# Patient Record
Sex: FEMALE | Race: WHITE | Employment: OTHER | URBAN - METROPOLITAN AREA
[De-identification: names, ages, dates, MRNs, and addresses within clinical notes are randomized per-mention and may not be internally consistent; named-entity substitution may affect disease eponyms.]

---

## 2017-09-18 ENCOUNTER — OFFICE VISIT (OUTPATIENT)
Dept: INTERNAL MEDICINE CLINIC | Age: 57
End: 2017-09-18

## 2017-09-18 VITALS
HEART RATE: 86 BPM | WEIGHT: 141 LBS | SYSTOLIC BLOOD PRESSURE: 107 MMHG | DIASTOLIC BLOOD PRESSURE: 66 MMHG | TEMPERATURE: 97.8 F | RESPIRATION RATE: 16 BRPM | HEIGHT: 66 IN | BODY MASS INDEX: 22.66 KG/M2 | OXYGEN SATURATION: 99 %

## 2017-09-18 DIAGNOSIS — Z00.00 PHYSICAL EXAM, ANNUAL: Primary | ICD-10-CM

## 2017-09-18 RX ORDER — GLUCOSAMINE SULFATE 1500 MG
POWDER IN PACKET (EA) ORAL DAILY
COMMUNITY

## 2017-09-18 NOTE — MR AVS SNAPSHOT
Visit Information Date & Time Provider Department Dept. Phone Encounter #  
 9/18/2017 10:30 AM Yasmin Stanford, [address] [phone] [id number] Follow-up Instructions Return in about 1 year (around 9/18/2018). Upcoming Health Maintenance Date Due  
 BREAST CANCER SCRN MAMMOGRAM 11/8/2018 PAP AKA CERVICAL CYTOLOGY 11/4/2019 COLONOSCOPY 8/10/2022 DTaP/Tdap/Td series (2 - Td) 8/10/2025 Allergies as of 9/18/2017  Review Complete On: 9/18/2017 By: Yasmin Stanford MD  
  
 Severity Noted Reaction Type Reactions Venom-honey Bee  07/16/2015    Unknown (comments) Current Immunizations  Never Reviewed Name Date Pneumococcal Polysaccharide (PPSV-23) 8/10/2015 Tdap 8/10/2015 Not reviewed this visit You Were Diagnosed With   
  
 Codes Comments Physical exam, annual    -  Primary ICD-10-CM: Z00.00 ICD-9-CM: V70.0 Vitals BP Pulse Temp Resp Height(growth percentile) Weight(growth percentile) 107/66 (BP 1 Location: Left arm, BP Patient Position: Sitting) 86 97.8 °F (36.6 °C) (Oral) 16 5' 6\" (1.676 m) 141 lb (64 kg) SpO2 BMI OB Status Smoking Status 99% 22.76 kg/m2 Postmenopausal Former Smoker Vitals History BMI and BSA Data Body Mass Index Body Surface Area  
 22.76 kg/m 2 1.73 m 2 Preferred Pharmacy Pharmacy Name Phone [phone] [facility], [address] [place] [phone] Your Updated Medication List  
  
   
This list is accurate as of: 9/18/17 10:35 AM.  Always use your most recent med list.  
  
  
  
  
 MULTIVITAMIN PO Take  by mouth. nystatin-triamcinolone topical cream  
Commonly known as:  MYCOLOG II Apply  to affected area two (2) times a day. Monday to Friday only VITAMIN D3 1,000 unit Cap Generic drug:  cholecalciferol Take  by mouth daily. We Performed the Following AMB POC EKG ROUTINE W/ 12 LEADS, INTER & REP [49387 CPT(R)] CBC W/O DIFF [65424 CPT(R)] HEMOGLOBIN A1C WITH EAG [47684 CPT(R)] LIPID PANEL [72641 CPT(R)] METABOLIC PANEL, COMPREHENSIVE [55586 CPT(R)] TSH 3RD GENERATION [20372 CPT(R)] VITAMIN D, 25 HYDROXY [id number] CPT(R)] Follow-up Instructions Return in about 1 year (around 9/18/2018). To-Do List   
 11/06/2017 Imaging:  JOE MAMMO BI SCREENING INCL CAD Introducing [facility]! Nora Bronson introduces Clarient patient portal. Now you can access parts of your medical record, email your doctor's office, and request medication refills online. 1. In your internet browser, go to [email or web address] 2. Click on the First Time User? Click Here link in the Sign In box. You will see the New Member Sign Up page. 3. Enter your Clarient Access Code exactly as it appears below. You will not need to use this code after youve completed the sign-up process. If you do not sign up before the expiration date, you must request a new code. · Clarient Access Code: [email or web address] Expires: 12/17/2017 10:35 AM 
 
4. Enter the last four digits of your Social Security Number (xxxx) and Date of Birth (mm/dd/yyyy) as indicated and click Submit. You will be taken to the next sign-up page. 5. Create a Clarient ID. This will be your Clarient login ID and cannot be changed, so think of one that is secure and easy to remember. 6. Create a Clarient password. You can change your password at any time. 7. Enter your Password Reset Question and Answer. This can be used at a later time if you forget your password. 8. Enter your e-mail address. You will receive e-mail notification when new information is available in [address] 9. Click Sign Up. You can now view and download portions of your medical record. 10. Click the Download Summary menu link to download a portable copy of your medical information. If you have questions, please visit the Frequently Asked Questions section of the MyChart website. Remember, [employer] is NOT to be used for urgent needs. For medical emergencies, dial 911. Now available from your iPhone and Android! Please provide this summary of care documentation to your next provider. Your primary care clinician is listed as Esme Rodriguez. If you have any questions after today's visit, please call [phone].

## 2017-09-18 NOTE — PROGRESS NOTES
HISTORY OF PRESENT ILLNESS  Tim Fairbanks is a 60 y.o. female. HPI   Last here 16. Pt is here to f/u on chronic conditions. BP is 107/66 today    Wt is stable since last visit  Her weight is within normal ranges  She is very active at home - gardening     Reviewed last labs   , vit D low, kidney nl, liver nl, thyroid nl, vit D levels mildly low, Hep C negative  Repeat labs today     Continues vit D OTC 1000U daily  Advised her to take 2000 U if vit D levels are low on labs  Pt states that she sun-tans and wears sunscreen frequently    Pt follows with derm at [facility]  Last visit was     Pt states that her memory is not as sharp as it used to be in the past  She states that she focuses on one task and forgets some information at work  Discussed memory being impaired with age and stress - no concern for dementia process today  Advised her to stop multi-tasking, focus on one task and write notes to herself     PREVENTIVE:  Colonoscopy: Dr. Virginia Corona, 8/10/2012; repeat 10 years  Pap: 16 at [facility] 27: 16 normal  Dexa: had one completed when she fractured her R arm  Tdap: 8/10/15  Pneumovax: 8/10/15  Zostavax: not yet needed; age 59  Flu shot: declines today, states she got sick with this in the past  Eye exam: , Dr. Melva Delatorre, scheduled 10/17 d/t floaters  Lipids:    Hep C:  negative  EK17, nsr    There are no active problems to display for this patient. Current Outpatient Prescriptions   Medication Sig Dispense Refill    MULTIVITAMIN PO Take  by mouth.  cholecalciferol (VITAMIN D3) 1,000 unit cap Take  by mouth daily.  nystatin-triamcinolone (MYCOLOG II) topical cream Apply  to affected area two (2) times a day.  Monday to Friday only 30 g 0     Past Surgical History:   Procedure Laterality Date    HX ORTHOPAEDIC      right arm      Lab Results  Component Value Date/Time   WBC 6.2 2016 11:26 AM   HGB 12.8 09/13/2016 11:26 AM   HCT 38.1 09/13/2016 11:26 AM   PLATELET 410 13/26/2725 11:26 AM   MCV 86 09/13/2016 11:26 AM     Lab Results  Component Value Date/Time   Cholesterol, total 174 09/13/2016 11:26 AM   HDL Cholesterol 47 09/13/2016 11:26 AM   LDL, calculated 113 09/13/2016 11:26 AM   Triglyceride 71 09/13/2016 11:26 AM     Lab Results  Component Value Date/Time   GFR est non- 09/13/2016 11:26 AM   GFR est  09/13/2016 11:26 AM   Creatinine 0.59 09/13/2016 11:26 AM   BUN 16 09/13/2016 11:26 AM   Sodium 140 09/13/2016 11:26 AM   Potassium 4.2 09/13/2016 11:26 AM   Chloride 101 09/13/2016 11:26 AM   CO2 25 09/13/2016 11:26 AM          Review of Systems   Respiratory: Negative for shortness of breath. Cardiovascular: Negative for chest pain. Psychiatric/Behavioral: Positive for memory loss. Physical Exam   Constitutional: She is oriented to person, place, and time. She appears well-developed and well-nourished. No distress. HENT:   Head: Normocephalic and atraumatic. Right Ear: External ear normal.   Left Ear: External ear normal.   Mouth/Throat: Oropharynx is clear and moist. No oropharyngeal exudate. Eyes: Conjunctivae and EOM are normal. Right eye exhibits no discharge. Left eye exhibits no discharge. Neck: Normal range of motion. Neck supple. No thyromegaly present. No carotid bruits     Cardiovascular: Normal rate, regular rhythm, normal heart sounds and intact distal pulses. Exam reveals no gallop and no friction rub. No murmur heard. Pulmonary/Chest: Effort normal and breath sounds normal. No respiratory distress. She has no wheezes. She has no rales. She exhibits no tenderness. Abdominal: Soft. She exhibits no distension and no mass. There is no tenderness. There is no rebound and no guarding. Musculoskeletal: Normal range of motion. She exhibits no edema, tenderness or deformity. Lymphadenopathy:     She has no cervical adenopathy.    Neurological: She is alert and oriented to person, place, and time. She has normal reflexes. She exhibits normal muscle tone. Coordination normal.   Skin: Skin is warm and dry. No rash noted. She is not diaphoretic. No erythema. No pallor. Psychiatric: She has a normal mood and affect. Her behavior is normal.       ASSESSMENT and PLAN    ICD-10-CM ICD-9-CM    1. Physical exam, annual    Wt nl, due for labs, ordered, declines flu shot, COLO UTD, mammogram and pelvic will be due in November, eye exam is in [place], ekg nsr Z00.00 V70.0 JOE MAMMO BI SCREENING INCL CAD      METABOLIC PANEL, COMPREHENSIVE      LIPID PANEL      CBC W/O DIFF      TSH 3RD GENERATION      VITAMIN D, 25 HYDROXY      HEMOGLOBIN A1C WITH EAG      AMB POC EKG ROUTINE W/ 12 LEADS, INTER & REP        Depression screen reviewed and negative. Scribed by Zonia Cornell of [address] [place] [address] [address], as dictated by Dr. Iva Mercado. Current diagnosis and concerns discussed with pt at length. Understands risks and benefits or current treatment plan and medications and accepts the treatment and medication with any possible risks. Pt asks appropriate questions which were answered. Pt instructed to call with any concerns or problems. This note will not be viewable in [address]

## 2017-09-19 LAB
25(OH)D3+25(OH)D2 SERPL-MCNC: 29.3 NG/ML (ref 30–100)
ALBUMIN SERPL-MCNC: 4.6 G/DL (ref 3.5–5.5)
ALBUMIN/GLOB SERPL: 1.8 {RATIO} (ref 1.2–2.2)
ALP SERPL-CCNC: 64 IU/L (ref 39–117)
ALT SERPL-CCNC: 10 IU/L (ref 0–32)
AST SERPL-CCNC: 15 IU/L (ref 0–40)
BILIRUB SERPL-MCNC: 0.7 MG/DL (ref 0–1.2)
BUN SERPL-MCNC: 17 MG/DL (ref 6–24)
BUN/CREAT SERPL: 27 (ref 9–23)
CALCIUM SERPL-MCNC: 9 MG/DL (ref 8.7–10.2)
CHLORIDE SERPL-SCNC: 103 MMOL/L (ref 96–106)
CHOLEST SERPL-MCNC: 160 MG/DL (ref 100–199)
CO2 SERPL-SCNC: 23 MMOL/L (ref 18–29)
CREAT SERPL-MCNC: 0.62 MG/DL (ref 0.57–1)
ERYTHROCYTE [DISTWIDTH] IN BLOOD BY AUTOMATED COUNT: 13.8 % (ref 12.3–15.4)
EST. AVERAGE GLUCOSE BLD GHB EST-MCNC: 105 MG/DL
GLOBULIN SER CALC-MCNC: 2.5 G/DL (ref 1.5–4.5)
GLUCOSE SERPL-MCNC: 93 MG/DL (ref 65–99)
HBA1C MFR BLD: 5.3 % (ref 4.8–5.6)
HCT VFR BLD AUTO: 38.8 % (ref 34–46.6)
HDLC SERPL-MCNC: 47 MG/DL
HGB BLD-MCNC: 13.1 G/DL (ref 11.1–15.9)
LDLC SERPL CALC-MCNC: 98 MG/DL (ref 0–99)
MCH RBC QN AUTO: 28.7 PG (ref 26.6–33)
MCHC RBC AUTO-ENTMCNC: 33.8 G/DL (ref 31.5–35.7)
MCV RBC AUTO: 85 FL (ref 79–97)
PLATELET # BLD AUTO: 174 X10E3/UL (ref 150–379)
POTASSIUM SERPL-SCNC: 4.2 MMOL/L (ref 3.5–5.2)
PROT SERPL-MCNC: 7.1 G/DL (ref 6–8.5)
RBC # BLD AUTO: 4.57 X10E6/UL (ref 3.77–5.28)
SODIUM SERPL-SCNC: 140 MMOL/L (ref 134–144)
TRIGL SERPL-MCNC: 73 MG/DL (ref 0–149)
TSH SERPL DL<=0.005 MIU/L-ACNC: 1.03 UIU/ML (ref 0.45–4.5)
VLDLC SERPL CALC-MCNC: 15 MG/DL (ref 5–40)
WBC # BLD AUTO: 4.9 X10E3/UL (ref 3.4–10.8)

## 2017-09-27 ENCOUNTER — HOSPITAL ENCOUNTER (OUTPATIENT)
Dept: MAMMOGRAPHY | Age: 57
Discharge: HOME | End: 2017-09-27
Attending: INTERNAL MEDICINE
Payer: COMMERCIAL

## 2017-09-27 DIAGNOSIS — Z00.00 PHYSICAL EXAM, ANNUAL: ICD-10-CM

## 2017-09-27 PROCEDURE — 77067 SCR MAMMO BI INCL CAD: CPT

## 2018-09-18 ENCOUNTER — OFFICE VISIT (OUTPATIENT)
Dept: INTERNAL MEDICINE CLINIC | Age: 58
End: 2018-09-18

## 2018-09-18 VITALS
BODY MASS INDEX: 22.5 KG/M2 | HEIGHT: 66 IN | DIASTOLIC BLOOD PRESSURE: 71 MMHG | WEIGHT: 140 LBS | TEMPERATURE: 98 F | OXYGEN SATURATION: 97 % | HEART RATE: 84 BPM | RESPIRATION RATE: 16 BRPM | SYSTOLIC BLOOD PRESSURE: 114 MMHG

## 2018-09-18 DIAGNOSIS — Z00.00 PHYSICAL EXAM, ANNUAL: Primary | ICD-10-CM

## 2018-09-18 DIAGNOSIS — R05.9 COUGH: ICD-10-CM

## 2018-09-18 DIAGNOSIS — E55.9 VITAMIN D DEFICIENCY: ICD-10-CM

## 2018-09-18 NOTE — MR AVS SNAPSHOT
[address] [place] [phone] Patient: Ramirez Arias MRN: [id number] DIE:[birth year] Visit Information Date & Time Provider Department Dept. Phone Encounter #  
 9/18/2018 10:30 AM David Linares, [address] [place] [address],[address] [phone] [id number] Follow-up Instructions Return in about 1 year (around 9/18/2019). Upcoming Health Maintenance Date Due Influenza Age 5 to Adult 7/15/2019* BREAST CANCER SCRN MAMMOGRAM 9/27/2019 PAP AKA CERVICAL CYTOLOGY 11/4/2019 COLONOSCOPY 8/10/2022 DTaP/Tdap/Td series (2 - Td) 8/10/2025 *Topic was postponed. The date shown is not the original due date. Allergies as of 9/18/2018  Review Complete On: 9/18/2017 By: David Linares MD  
  
 Severity Noted Reaction Type Reactions Venom-honey Bee  07/16/2015    Unknown (comments) Current Immunizations  Never Reviewed Name Date Pneumococcal Polysaccharide (PPSV-23) 8/10/2015 Tdap 8/10/2015 Not reviewed this visit You Were Diagnosed With   
  
 Codes Comments Physical exam, annual    -  Primary ICD-10-CM: Z00.00 ICD-9-CM: V70.0 Cough     ICD-10-CM: R05 ICD-9-CM: 232. 2 Vitamin D deficiency     ICD-10-CM: E55.9 ICD-9-CM: 268.9 Vitals BP Pulse Temp Resp Height(growth percentile) Weight(growth percentile) 114/71 (BP 1 Location: Left arm, BP Patient Position: Sitting) 84 98 °F (36.7 °C) (Oral) 16 5' 6\" (1.676 m) 140 lb (63.5 kg) SpO2 BMI OB Status Smoking Status 97% 22.6 kg/m2 Postmenopausal Former Smoker Vitals History BMI and BSA Data Body Mass Index Body Surface Area  
 22.6 kg/m 2 1.72 m 2 Your Updated Medication List  
  
   
This list is accurate as of 9/18/18 10:51 AM.  Always use your most recent med list.  
  
  
  
  
 MULTIVITAMIN PO Take  by mouth.   
  
 nystatin-triamcinolone topical cream  
 Commonly known as:  MYCOLOG II Apply  to affected area two (2) times a day. Monday to Friday only VITAMIN D3 1,000 unit Cap Generic drug:  cholecalciferol Take  by mouth daily. We Performed the Following CBC W/O DIFF [78973 CPT(R)] HEMOGLOBIN A1C WITH EAG [55859 CPT(R)] LIPID PANEL [92656 CPT(R)] METABOLIC PANEL, COMPREHENSIVE [39053 CPT(R)] TSH 3RD GENERATION [79683 CPT(R)] VITAMIN D, 25 HYDROXY [id number] CPT(R)] Follow-up Instructions Return in about 1 year (around 9/18/2019). To-Do List   
 09/18/2018 Imaging:  JOE MAMMO BI SCREENING INCL CAD   
  
 09/18/2018 Imaging:  XR CHEST PA LAT Patient Instructions   
flonase as needed for ear pressure Labs today Introducing [facility]! Tez Maciel introduces [employer] patient portal. Now you can access parts of your medical record, email your doctor's office, and request medication refills online. 1. In your internet browser, go to [email or web address] 2. Click on the First Time User? Click Here link in the Sign In box. You will see the New Member Sign Up page. 3. Enter your [employer] Access Code exactly as it appears below. You will not need to use this code after youve completed the sign-up process. If you do not sign up before the expiration date, you must request a new code. · [employer] Access Code: [email or web address] Expires: 12/17/2018 10:51 AM 
 
4. Enter the last four digits of your Social Security Number (xxxx) and Date of Birth (mm/dd/yyyy) as indicated and click Submit. You will be taken to the next sign-up page. 5. Create a iMERt ID. This will be your Force-A login ID and cannot be changed, so think of one that is secure and easy to remember. 6. Create a Force-A password. You can change your password at any time. 7. Enter your Password Reset Question and Answer. This can be used at a later time if you forget your password. 8. Enter your e-mail address. You will receive e-mail notification when new information is available in [address] 9. Click Sign Up. You can now view and download portions of your medical record. 10. Click the Download Summary menu link to download a portable copy of your medical information. If you have questions, please visit the Frequently Asked Questions section of the [employer] website. Remember, [employer] is NOT to be used for urgent needs. For medical emergencies, dial 911. Now available from your iPhone and Android! Please provide this summary of care documentation to your next provider. Your primary care clinician is listed as Jamin Khan. If you have any questions after today's visit, please call [phone].

## 2018-09-18 NOTE — PROGRESS NOTES
HISTORY OF PRESENT ILLNESS Maranda Sanon is a 59 y.o. female. HPI Last here 17. Pt is here to f/u on chronic conditions. 
  
BP is 114/71 
  
Wt is stable since last visit Her weight is within normal ranges 
   
Reviewed last labs  Will get labs today 
   
Continues vit D OTC 1000U BID 
  
Pt follows with derm at [facility] Last visit was recently for a rash on her arm Pt c/o her R ear feeling like there is water in it Discussed zyrtec and flonaes Pt has had an intermittent dry cough x 2 months Pt has not taken anything for her sx States that her sx do not bother her Will check CXR Advised taking zyrtec and flonase Pt states she doesn't remember things like she used to She takes care of both of her parents which she says means there's a lot in her head Denies problems such as forgetting where she's driving Discussed delayed recall is nl at her age Discussed there is not much to be done at this stage Of note, her mother is also my patient Uliheraclio Humberto) 
  
PREVENTIVE: 
Colonoscopy: Dr. Ambreen Asher, 8/10/2012; repeat 10 years Pap: 2017 at [facility] Mammogram: 17 normal 
Dexa: had one completed when she fractured her R arm Tdap: 8/10/15 Pneumovax: 8/10/15 Zostavax: not yet needed; age 59 Flu shot: declines today , states she got sick with this in the past 
Eye exam: 10/17, Dr. Cabello Lab, scheduled 10/18 Lipids:  LDL 98 Hep C:  negative EK17, nsr There are no active problems to display for this patient. Current Outpatient Prescriptions Medication Sig Dispense Refill  MULTIVITAMIN PO Take  by mouth.  cholecalciferol (VITAMIN D3) 1,000 unit cap Take  by mouth daily.  nystatin-triamcinolone (MYCOLOG II) topical cream Apply  to affected area two (2) times a day. Monday to Friday only 30 g 0 Past Surgical History:  
Procedure Laterality Date  HX ORTHOPAEDIC    
 right arm Lab Results Component Value Date/Time WBC 4.9 09/18/2017 11:07 AM  
HGB 13.1 09/18/2017 11:07 AM  
HCT 38.8 09/18/2017 11:07 AM  
PLATELET 555 05/29/0098 11:07 AM  
MCV 85 09/18/2017 11:07 AM  
 
Lab Results Component Value Date/Time Cholesterol, total 160 09/18/2017 11:07 AM  
HDL Cholesterol 47 09/18/2017 11:07 AM  
LDL, calculated 98 09/18/2017 11:07 AM  
Triglyceride 73 09/18/2017 11:07 AM  
 
Lab Results Component Value Date/Time GFR est non- 09/18/2017 11:07 AM  
GFR est  09/18/2017 11:07 AM  
Creatinine 0.62 09/18/2017 11:07 AM  
BUN 17 09/18/2017 11:07 AM  
Sodium 140 09/18/2017 11:07 AM  
Potassium 4.2 09/18/2017 11:07 AM  
Chloride 103 09/18/2017 11:07 AM  
CO2 23 09/18/2017 11:07 AM  
  
 
Review of Systems Respiratory: Positive for cough. Negative for sputum production and shortness of breath. Cardiovascular: Negative for chest pain. Physical Exam  
Constitutional: She is oriented to person, place, and time. She appears well-developed and well-nourished. No distress. HENT:  
Head: Normocephalic and atraumatic. Right Ear: External ear normal.  
Left Ear: External ear normal.  
Mouth/Throat: Oropharynx is clear and moist. No oropharyngeal exudate. Eyes: Conjunctivae and EOM are normal. Pupils are equal, round, and reactive to light. Right eye exhibits no discharge. Left eye exhibits no discharge. Neck: Normal range of motion. Neck supple. No carotid bruits Cardiovascular: Normal rate, regular rhythm, normal heart sounds and intact distal pulses. Exam reveals no gallop and no friction rub. No murmur heard. Pulmonary/Chest: Effort normal and breath sounds normal. No respiratory distress. She has no wheezes. She has no rales. She exhibits no tenderness. Abdominal: Soft. She exhibits no distension and no mass. There is no tenderness. There is no rebound and no guarding. Musculoskeletal: Normal range of motion. She exhibits no edema, tenderness or deformity. Lymphadenopathy:  
  She has no cervical adenopathy. Neurological: She is alert and oriented to person, place, and time. Coordination normal.  
Skin: Skin is warm and dry. No rash noted. She is not diaphoretic. No erythema. No pallor. Psychiatric: She has a normal mood and affect. Her behavior is normal.  
 
 
ASSESSMENT and PLAN 
  ICD-10-CM ICD-9-CM 1. Physical exam, annual 
 
Nl wt, nl BP, declines flu shot, eye exam UTD and repeat scheduled, mammo due, repeat ordered, pelvic exam due in November, COLO UTD 
 Z00.00 V70.0 LIPID PANEL  
   METABOLIC PANEL, COMPREHENSIVE  
   CBC W/O DIFF  
   TSH 3RD GENERATION  
   HEMOGLOBIN A1C WITH EAG 2. Cough Will get CXR, discussed flonase and zyrtec R05 786.2 XR CHEST PA LAT 3. Vitamin D deficiency Continue OTC 2000U per day, check level E55.9 268.9 VITAMIN D, 25 HYDROXY Scribed by Viktoriya Faith of [address] [place] [address] [address], as dictated by Dr. Shima Padilla. Current diagnosis and concerns discussed with pt at length. Pt understands risks and benefits or current treatment plan and medications, and accepts the treatment and medication with any possible risks. Pt asks appropriate questions, which were answered. Pt was instructed to call with any concerns or problems. This note will not be viewable in [address]

## 2018-09-19 LAB
25(OH)D3+25(OH)D2 SERPL-MCNC: 28.7 NG/ML (ref 30–100)
ALBUMIN SERPL-MCNC: 4.7 G/DL (ref 3.5–5.5)
ALBUMIN/GLOB SERPL: 1.8 {RATIO} (ref 1.2–2.2)
ALP SERPL-CCNC: 65 IU/L (ref 39–117)
ALT SERPL-CCNC: 7 IU/L (ref 0–32)
AST SERPL-CCNC: 15 IU/L (ref 0–40)
BILIRUB SERPL-MCNC: 0.5 MG/DL (ref 0–1.2)
BUN SERPL-MCNC: 17 MG/DL (ref 6–24)
BUN/CREAT SERPL: 27 (ref 9–23)
CALCIUM SERPL-MCNC: 9 MG/DL (ref 8.7–10.2)
CHLORIDE SERPL-SCNC: 103 MMOL/L (ref 96–106)
CHOLEST SERPL-MCNC: 166 MG/DL (ref 100–199)
CO2 SERPL-SCNC: 23 MMOL/L (ref 20–29)
CREAT SERPL-MCNC: 0.63 MG/DL (ref 0.57–1)
ERYTHROCYTE [DISTWIDTH] IN BLOOD BY AUTOMATED COUNT: 13.9 % (ref 12.3–15.4)
EST. AVERAGE GLUCOSE BLD GHB EST-MCNC: 105 MG/DL
GLOBULIN SER CALC-MCNC: 2.6 G/DL (ref 1.5–4.5)
GLUCOSE SERPL-MCNC: 88 MG/DL (ref 65–99)
HBA1C MFR BLD: 5.3 % (ref 4.8–5.6)
HCT VFR BLD AUTO: 38.8 % (ref 34–46.6)
HDLC SERPL-MCNC: 42 MG/DL
HGB BLD-MCNC: 12.9 G/DL (ref 11.1–15.9)
LDLC SERPL CALC-MCNC: 109 MG/DL (ref 0–99)
MCH RBC QN AUTO: 28.7 PG (ref 26.6–33)
MCHC RBC AUTO-ENTMCNC: 33.2 G/DL (ref 31.5–35.7)
MCV RBC AUTO: 86 FL (ref 79–97)
PLATELET # BLD AUTO: 176 X10E3/UL (ref 150–379)
POTASSIUM SERPL-SCNC: 4.2 MMOL/L (ref 3.5–5.2)
PROT SERPL-MCNC: 7.3 G/DL (ref 6–8.5)
RBC # BLD AUTO: 4.49 X10E6/UL (ref 3.77–5.28)
SODIUM SERPL-SCNC: 141 MMOL/L (ref 134–144)
TRIGL SERPL-MCNC: 75 MG/DL (ref 0–149)
TSH SERPL DL<=0.005 MIU/L-ACNC: 1.27 UIU/ML (ref 0.45–4.5)
VLDLC SERPL CALC-MCNC: 15 MG/DL (ref 5–40)
WBC # BLD AUTO: 5 X10E3/UL (ref 3.4–10.8)

## 2018-11-13 ENCOUNTER — HOSPITAL ENCOUNTER (OUTPATIENT)
Dept: MAMMOGRAPHY | Age: 58
Discharge: HOME | End: 2018-11-13
Attending: INTERNAL MEDICINE
Payer: COMMERCIAL

## 2018-11-13 DIAGNOSIS — Z12.39 SCREENING BREAST EXAMINATION: ICD-10-CM

## 2018-11-13 PROCEDURE — 77067 SCR MAMMO BI INCL CAD: CPT

## 2019-02-05 ENCOUNTER — TELEPHONE (OUTPATIENT)
Dept: INTERNAL MEDICINE CLINIC | Age: 59
End: 2019-02-05

## 2019-02-05 NOTE — TELEPHONE ENCOUNTER
Patient states she needs a call back to discuss Anxiety issues & possibly getting something prescribed. Please call.  Thank you

## 2019-02-05 NOTE — TELEPHONE ENCOUNTER
Spoke to pt. Reported pt would have to be seen in clinic. Offered appointment 5/6/19 at 1000. Pt accepted with no further questions.

## 2019-02-06 ENCOUNTER — OFFICE VISIT (OUTPATIENT)
Dept: INTERNAL MEDICINE CLINIC | Age: 59
End: 2019-02-06

## 2019-02-06 VITALS
WEIGHT: 142 LBS | BODY MASS INDEX: 22.82 KG/M2 | HEIGHT: 66 IN | OXYGEN SATURATION: 100 % | DIASTOLIC BLOOD PRESSURE: 82 MMHG | TEMPERATURE: 98 F | SYSTOLIC BLOOD PRESSURE: 136 MMHG | RESPIRATION RATE: 16 BRPM | HEART RATE: 77 BPM

## 2019-02-06 DIAGNOSIS — F41.9 ANXIETY: Primary | ICD-10-CM

## 2019-02-06 RX ORDER — SERTRALINE HYDROCHLORIDE 50 MG/1
50 TABLET, FILM COATED ORAL DAILY
Qty: 30 TAB | Refills: 4 | Status: SHIPPED | OUTPATIENT
Start: 2019-02-06 | End: 2019-07-16 | Stop reason: SDUPTHER

## 2019-02-06 NOTE — PROGRESS NOTES
HISTORY OF PRESENT ILLNESS Kim Mckeon is a 59 y.o. female. HPI Last here 18. Pt is here to f/u on chronic conditions. 
  
Pt c/o increased anxiety and irritability She states that this has increased over the past year, but especially over the past couple months Her parents live with her, and she has been more irritable with them, such as snapping at them about things This has been a stressful situation for her Denies feeling depressed Discussed medication to assist with irritability Pt was on a medication in the past, and thinks it helped, and does not remember having any SE Discussed zoloft and its SE Pt wonders if her OTC sleep aid will interfere with this, discussed that this should be fine Will start zoloft 50mg BP is 136/82 
  
Wt today is 142 lbs --up 2 lbs x lov Her weight is within normal ranges 
   
Reviewed labs  
   
Continues vit D OTC 1000U BID 
  
Pt follows with derm at [facility] Last visit was recently for a rash on her arm Reviewed mammo : nl 
  
Reviewed CXR : No acute abnormality. Of note, her mother is also my patient Tin Link) 
  
PREVENTIVE: 
Colonoscopy: Dr. Ana Rosa Brian, 8/10/2012; repeat 10 years [facility]: [facility] Mammogram: 18, negative Dexa: had one completed when she fractured her R arm Tdap: 8/10/15 Pneumovax: 8/10/15 Shingrix: not yet needed; age 59 Flu shot: declines states she got sick with this in the past 
Eye exam: 10/18, Dr. Sendy Soliz, pt will be scheduling cataract surg for the future Lipids:   Hep C:  negative EK17, nsr There are no active problems to display for this patient. Current Outpatient Medications Medication Sig Dispense Refill  MULTIVITAMIN PO Take  by mouth.  cholecalciferol (VITAMIN D3) 1,000 unit cap Take  by mouth daily.     
 nystatin-triamcinolone (MYCOLOG II) topical cream Apply  to affected area two (2) times a day. Monday to Friday only 30 g 0 Past Surgical History:  
Procedure Laterality Date  HX ORTHOPAEDIC    
 right arm Lab Results Component Value Date/Time WBC 5.0 09/18/2018 11:08 AM  
 HGB 12.9 09/18/2018 11:08 AM  
 HCT 38.8 09/18/2018 11:08 AM  
 PLATELET 010 30/82/7439 11:08 AM  
 MCV 86 09/18/2018 11:08 AM  
 
Lab Results Component Value Date/Time Cholesterol, total 166 09/18/2018 11:08 AM  
 HDL Cholesterol 42 09/18/2018 11:08 AM  
 LDL, calculated 109 (H) 09/18/2018 11:08 AM  
 Triglyceride 75 09/18/2018 11:08 AM  
 
Lab Results Component Value Date/Time GFR est non-AA 98 09/18/2018 11:08 AM  
 GFR est  09/18/2018 11:08 AM  
 Creatinine 0.63 09/18/2018 11:08 AM  
 BUN 17 09/18/2018 11:08 AM  
 Sodium 141 09/18/2018 11:08 AM  
 Potassium 4.2 09/18/2018 11:08 AM  
 Chloride 103 09/18/2018 11:08 AM  
 CO2 23 09/18/2018 11:08 AM  
  
Review of Systems Respiratory: Negative for shortness of breath. Cardiovascular: Negative for chest pain. Psychiatric/Behavioral: The patient is nervous/anxious. Physical Exam  
Constitutional: She is oriented to person, place, and time. She appears well-developed and well-nourished. No distress. HENT:  
Head: Normocephalic and atraumatic. Mouth/Throat: Oropharynx is clear and moist. No oropharyngeal exudate. Eyes: Conjunctivae and EOM are normal. Right eye exhibits no discharge. Left eye exhibits no discharge. Neck: Normal range of motion. Neck supple. Cardiovascular: Normal rate, regular rhythm and normal heart sounds. Exam reveals no gallop and no friction rub. No murmur heard. Pulmonary/Chest: Effort normal and breath sounds normal. No respiratory distress. She has no wheezes. She has no rales. She exhibits no tenderness. Musculoskeletal: Normal range of motion. She exhibits no edema, tenderness or deformity. Lymphadenopathy:  
  She has no cervical adenopathy. Neurological: She is alert and oriented to person, place, and time. Coordination normal.  
Skin: Skin is warm and dry. No rash noted. She is not diaphoretic. No erythema. No pallor. Psychiatric: She has a normal mood and affect. Her behavior is normal.  
 
 
ASSESSMENT and PLAN 
  ICD-10-CM ICD-9-CM 1. Anxiety F41.9 300.00 1. Anxiety - will treat with zoloft 50mg daily, will have her f/u in about 6 weeks, she will call back to schedule as she will need a pre-op for cardiac surgery in about that time frame as well Depression screen reviewed and negative. Scribed by Sarabjit Dale of [address] [place] [address] [address], as dictated by Dr. Collette Dent. Current diagnosis and concerns discussed with pt at length. Pt understands risks and benefits or current treatment plan and medications, and accepts the treatment and medication with any possible risks. Pt asks appropriate questions, which were answered. Pt was instructed to call with any concerns or problems. I have reviewed the note documented by the scribe. The services provided are my own. The documentation is accurate

## 2019-03-08 ENCOUNTER — TELEPHONE (OUTPATIENT)
Dept: INTERNAL MEDICINE CLINIC | Age: 59
End: 2019-03-08

## 2019-03-08 NOTE — TELEPHONE ENCOUNTER
Called, spoke to pt. Two pt identifiers confirmed. Pt offered and accepted appt for [email or web address]  Pt was advised to bring any preop forms that need to be filled out. Pt verbalized understanding of information discussed w/ no further questions at this time.

## 2019-04-17 ENCOUNTER — OFFICE VISIT (OUTPATIENT)
Dept: INTERNAL MEDICINE CLINIC | Age: 59
End: 2019-04-17

## 2019-04-17 ENCOUNTER — TELEPHONE (OUTPATIENT)
Dept: INTERNAL MEDICINE CLINIC | Age: 59
End: 2019-04-17

## 2019-04-17 VITALS
RESPIRATION RATE: 16 BRPM | WEIGHT: 143 LBS | HEIGHT: 66 IN | OXYGEN SATURATION: 98 % | TEMPERATURE: 98 F | DIASTOLIC BLOOD PRESSURE: 65 MMHG | HEART RATE: 86 BPM | SYSTOLIC BLOOD PRESSURE: 102 MMHG | BODY MASS INDEX: 22.98 KG/M2

## 2019-04-17 DIAGNOSIS — F41.9 ANXIETY: ICD-10-CM

## 2019-04-17 DIAGNOSIS — S61.211A LACERATION OF LEFT INDEX FINGER WITHOUT FOREIGN BODY WITHOUT DAMAGE TO NAIL, INITIAL ENCOUNTER: ICD-10-CM

## 2019-04-17 DIAGNOSIS — Z01.818 PREOP EXAM FOR INTERNAL MEDICINE: Primary | ICD-10-CM

## 2019-04-17 LAB
BUN SERPL-MCNC: 16 MG/DL (ref 8–27)
BUN/CREAT SERPL: 19 (ref 12–28)
CALCIUM SERPL-MCNC: 8.9 MG/DL (ref 8.7–10.3)
CHLORIDE SERPL-SCNC: 102 MMOL/L (ref 96–106)
CO2 SERPL-SCNC: 23 MMOL/L (ref 20–29)
CREAT SERPL-MCNC: 0.84 MG/DL (ref 0.57–1)
GLUCOSE SERPL-MCNC: 84 MG/DL (ref 65–99)
POTASSIUM SERPL-SCNC: 4.4 MMOL/L (ref 3.5–5.2)
SODIUM SERPL-SCNC: 139 MMOL/L (ref 134–144)

## 2019-04-17 NOTE — TELEPHONE ENCOUNTER
----- Message from Darleen Stevens sent at 4/17/2019  8:49 AM EDT -----  Regarding: Dr. Anselmo Munoz  Patient is running about 10 minutes late for her 9 am appointment today. Her number is [phone].       Copy/paste envera

## 2019-04-18 ENCOUNTER — DOCUMENTATION ONLY (OUTPATIENT)
Dept: INTERNAL MEDICINE CLINIC | Age: 59
End: 2019-04-18

## 2019-04-18 NOTE — PROGRESS NOTES
Pre-op form faxed, per pt request, to number provided on form. [phone]. Fax confirmation received. Copy placed in fax confirmation. Original form scanned to chart.

## 2019-07-16 RX ORDER — SERTRALINE HYDROCHLORIDE 50 MG/1
50 TABLET, FILM COATED ORAL DAILY
Qty: 30 TAB | Refills: 5 | Status: SHIPPED | OUTPATIENT
Start: 2019-07-16 | End: 2020-01-13 | Stop reason: SDUPTHER

## 2019-07-16 NOTE — TELEPHONE ENCOUNTER
PCP: Alex Elias MD    Last appt: 4/17/2019  Future Appointments   Date Time Provider Inga Roth   9/24/2019 10:30 AM Alex Elias MD [facility]       Requested Prescriptions     Pending Prescriptions Disp Refills    sertraline (ZOLOFT) 50 mg tablet 30 Tab 5     Sig: Take 1 Tab by mouth daily.

## 2019-11-22 ENCOUNTER — HOSPITAL ENCOUNTER (OUTPATIENT)
Dept: MAMMOGRAPHY | Age: 59
Discharge: HOME | End: 2019-11-22
Attending: INTERNAL MEDICINE
Payer: COMMERCIAL

## 2019-11-22 DIAGNOSIS — Z12.31 VISIT FOR SCREENING MAMMOGRAM: ICD-10-CM

## 2019-11-22 PROCEDURE — 77067 SCR MAMMO BI INCL CAD: CPT

## 2019-12-04 ENCOUNTER — OFFICE VISIT (OUTPATIENT)
Dept: INTERNAL MEDICINE CLINIC | Age: 59
End: 2019-12-04

## 2019-12-04 VITALS
DIASTOLIC BLOOD PRESSURE: 69 MMHG | BODY MASS INDEX: 23.95 KG/M2 | TEMPERATURE: 98.1 F | HEIGHT: 66 IN | HEART RATE: 71 BPM | RESPIRATION RATE: 16 BRPM | SYSTOLIC BLOOD PRESSURE: 104 MMHG | OXYGEN SATURATION: 98 % | WEIGHT: 149 LBS

## 2019-12-04 DIAGNOSIS — M54.30 SCIATIC LEG PAIN: ICD-10-CM

## 2019-12-04 DIAGNOSIS — Z00.00 PHYSICAL EXAM, ANNUAL: Primary | ICD-10-CM

## 2019-12-04 DIAGNOSIS — F41.9 ANXIETY: ICD-10-CM

## 2019-12-04 NOTE — PROGRESS NOTES
HISTORY OF PRESENT ILLNESS  Opal Ulloa is a 62 y.o. female. HPI  Last here 19. Pt is here to f/u on chronic conditions     BP is controlled      Wt today is 149 lbs-- up 7 lbs x lov   She states she has gained a couple of lbs since starting zoloft   She is trying to exercise every day   Her weight is within normal ranges      Reviewed labs        Continues vit D OTC 1000U BID     Pt follows with derm at [facility]  Last visit was spring 2019      Pt is taking an OTC sleep aid with melatonin in it        Pt is on zoloft 50 mg  for anxiety   States this has been helping but still has bad days   C/o recent insomnia   She goes to bed at 10 and gets up at 4 and unable to get back to sleep   Discussed sleep hygiene  Discussed sleeping in a dark quiet room, going to bed at the same time, avoiding naps, only sleeping in bed and not other activities, going to another room for a quiet activity if woken up   Discussed waking up at 4 and the next day she will be tired        Pt c/o pain in her R hip that she states goes down her leg and behind her knee x 10 days   She takes an ibouprofen for this which helps and mostly resolves pain   States today it is bothering her but no pain yesterday   Denies any injury   Will give home exercises   Denies bladder or bowel incont         Reviewed mammo -- negative        Of note, her mother is also my patient Zechariah Barnard)     PREVENTIVE:  Colonoscopy: Dr. Kal Romeor, 8/10/2012; repeat 10 years  [facility]  Dexa: had one completed when she fractured her R arm  Tdap:   Pneumovax: 8/10/15  Shingrix: ordered 19   Flu shot: declines states she got sick with this in the past  Eye exam: , Dr. Adalid Brandon cataract surg   and   Lipids:  LDL 109  Hep C:  negative  EK17, nsr  There are no active problems to display for this patient.     Current Outpatient Medications Medication Sig Dispense Refill    sertraline (ZOLOFT) 50 mg tablet Take 1 Tab by mouth daily. 30 Tab 5    MULTIVITAMIN PO Take  by mouth.  cholecalciferol (VITAMIN D3) 1,000 unit cap Take  by mouth daily.  nystatin-triamcinolone (MYCOLOG II) topical cream Apply  to affected area two (2) times a day. Monday to Friday only 30 g 0     Past Surgical History:   Procedure Laterality Date    HX ORTHOPAEDIC      right arm      Lab Results   Component Value Date/Time    WBC 5.0 09/18/2018 11:08 AM    HGB 12.9 09/18/2018 11:08 AM    HCT 38.8 09/18/2018 11:08 AM    PLATELET 060 85/36/7903 11:08 AM    MCV 86 09/18/2018 11:08 AM     Lab Results   Component Value Date/Time    Cholesterol, total 166 09/18/2018 11:08 AM    HDL Cholesterol 42 09/18/2018 11:08 AM    LDL, calculated 109 (H) 09/18/2018 11:08 AM    Triglyceride 75 09/18/2018 11:08 AM     Lab Results   Component Value Date/Time    GFR est non-AA 76 04/17/2019 09:31 AM    GFR est AA 87 04/17/2019 09:31 AM    Creatinine 0.84 04/17/2019 09:31 AM    BUN 16 04/17/2019 09:31 AM    Sodium 139 04/17/2019 09:31 AM    Potassium 4.4 04/17/2019 09:31 AM    Chloride 102 04/17/2019 09:31 AM    CO2 23 04/17/2019 09:31 AM        Review of Systems   Constitutional: Negative for chills and fever. Respiratory: Negative for shortness of breath. Cardiovascular: Negative for chest pain. Musculoskeletal: Positive for back pain. Physical Exam  Constitutional:       General: She is not in acute distress. Appearance: She is well-developed. She is not diaphoretic. HENT:      Head: Normocephalic and atraumatic. Right Ear: Tympanic membrane, ear canal and external ear normal.      Left Ear: Tympanic membrane, ear canal and external ear normal.      Mouth/Throat:      Mouth: Mucous membranes are moist.      Pharynx: Oropharynx is clear. No oropharyngeal exudate or posterior oropharyngeal erythema. Eyes:      General: No scleral icterus.         Right eye: No discharge. Left eye: No discharge. Conjunctiva/sclera: Conjunctivae normal.      Pupils: Pupils are equal, round, and reactive to light. Neck:      Musculoskeletal: Normal range of motion and neck supple. Vascular: No carotid bruit. Cardiovascular:      Rate and Rhythm: Normal rate and regular rhythm. Heart sounds: Normal heart sounds. No murmur. No friction rub. No gallop. Pulmonary:      Effort: Pulmonary effort is normal. No respiratory distress. Breath sounds: Normal breath sounds. No wheezing or rales. Chest:      Chest wall: No tenderness. Abdominal:      General: There is no distension. Palpations: Abdomen is soft. There is no mass. Tenderness: There is no tenderness. There is no guarding or rebound. Hernia: No hernia is present. Musculoskeletal: Normal range of motion. General: No tenderness or deformity. Right lower leg: No edema. Left lower leg: No edema. Comments: Negative SLR BLE     5/5 strength BLE    Lymphadenopathy:      Cervical: No cervical adenopathy. Skin:     General: Skin is warm and dry. Coloration: Skin is not pale. Findings: No erythema or rash. Neurological:      Mental Status: She is alert and oriented to person, place, and time. Coordination: Coordination normal.   Psychiatric:         Behavior: Behavior normal.         ASSESSMENT and PLAN    ICD-10-CM ICD-9-CM    1. Physical exam, annual              Nl wt nl bP labs today declines flu shot colo utd pelvic due mammo utd eye exam utd shingrix ordered  Z00.00 V70.0 LIPID PANEL      METABOLIC PANEL, COMPREHENSIVE      CBC W/O DIFF      TSH 3RD GENERATION      HEMOGLOBIN A1C WITH EAG   2. Anxiety              controlled on zoloft continue discussed sleep hygiene  F41.9 300.00    3.  Sciatic leg pain                  Provided exercises to complete sxs are mild improved with motrin no warning signs no new meds needed  M54.30 724.3 Depression screen reviewed and negative. Scribed by Roseilne Javier of [address] [place] [address] [address], as dictated by Dr. Harrie Nyhan. Current diagnosis and concerns discussed with pt at length. Pt understands risks and benefits or current treatment plan and medications, and accepts the treatment and medication with any possible risks. Pt asks appropriate questions, which were answered. Pt was instructed to call with any concerns or problems. I have reviewed the note documented by the scribe. The services provided are my own.   The documentation is accurate

## 2019-12-05 LAB
ALBUMIN SERPL-MCNC: 4.8 G/DL (ref 3.6–4.8)
ALBUMIN/GLOB SERPL: 2.3 {RATIO} (ref 1.2–2.2)
ALP SERPL-CCNC: 63 IU/L (ref 39–117)
ALT SERPL-CCNC: 11 IU/L (ref 0–32)
AST SERPL-CCNC: 19 IU/L (ref 0–40)
BILIRUB SERPL-MCNC: 0.5 MG/DL (ref 0–1.2)
BUN SERPL-MCNC: 15 MG/DL (ref 8–27)
BUN/CREAT SERPL: 24 (ref 12–28)
CALCIUM SERPL-MCNC: 9.2 MG/DL (ref 8.7–10.3)
CHLORIDE SERPL-SCNC: 102 MMOL/L (ref 96–106)
CHOLEST SERPL-MCNC: 167 MG/DL (ref 100–199)
CO2 SERPL-SCNC: 21 MMOL/L (ref 20–29)
CREAT SERPL-MCNC: 0.63 MG/DL (ref 0.57–1)
ERYTHROCYTE [DISTWIDTH] IN BLOOD BY AUTOMATED COUNT: 12.5 % (ref 12.3–15.4)
EST. AVERAGE GLUCOSE BLD GHB EST-MCNC: 105 MG/DL
GLOBULIN SER CALC-MCNC: 2.1 G/DL (ref 1.5–4.5)
GLUCOSE SERPL-MCNC: 91 MG/DL (ref 65–99)
HBA1C MFR BLD: 5.3 % (ref 4.8–5.6)
HCT VFR BLD AUTO: 40.6 % (ref 34–46.6)
HDLC SERPL-MCNC: 45 MG/DL
HGB BLD-MCNC: 13.5 G/DL (ref 11.1–15.9)
LDLC SERPL CALC-MCNC: 110 MG/DL (ref 0–99)
MCH RBC QN AUTO: 29.4 PG (ref 26.6–33)
MCHC RBC AUTO-ENTMCNC: 33.3 G/DL (ref 31.5–35.7)
MCV RBC AUTO: 89 FL (ref 79–97)
PLATELET # BLD AUTO: 182 X10E3/UL (ref 150–450)
POTASSIUM SERPL-SCNC: 4.3 MMOL/L (ref 3.5–5.2)
PROT SERPL-MCNC: 6.9 G/DL (ref 6–8.5)
RBC # BLD AUTO: 4.59 X10E6/UL (ref 3.77–5.28)
SODIUM SERPL-SCNC: 139 MMOL/L (ref 134–144)
TRIGL SERPL-MCNC: 61 MG/DL (ref 0–149)
TSH SERPL DL<=0.005 MIU/L-ACNC: 1.31 UIU/ML (ref 0.45–4.5)
VLDLC SERPL CALC-MCNC: 12 MG/DL (ref 5–40)
WBC # BLD AUTO: 5.3 X10E3/UL (ref 3.4–10.8)

## 2020-01-13 RX ORDER — SERTRALINE HYDROCHLORIDE 50 MG/1
50 TABLET, FILM COATED ORAL DAILY
Qty: 30 TAB | Refills: 5 | Status: SHIPPED | OUTPATIENT
Start: 2020-01-13 | End: 2020-07-13

## 2020-01-13 NOTE — TELEPHONE ENCOUNTER
PCP: Pablo Ashraf MD    Last appt: 12/4/2019  Future Appointments   Date Time Provider Inga Roth   12/8/2020 10:30 AM [facility]       Requested Prescriptions     Pending Prescriptions Disp Refills    sertraline (ZOLOFT) 50 mg tablet 30 Tab 5     Sig: Take 1 Tab by mouth daily.

## 2020-02-10 ENCOUNTER — OFFICE VISIT (OUTPATIENT)
Dept: INTERNAL MEDICINE CLINIC | Age: 60
End: 2020-02-10

## 2020-02-10 VITALS
BODY MASS INDEX: 23.3 KG/M2 | HEIGHT: 66 IN | TEMPERATURE: 98.8 F | RESPIRATION RATE: 18 BRPM | SYSTOLIC BLOOD PRESSURE: 98 MMHG | DIASTOLIC BLOOD PRESSURE: 59 MMHG | WEIGHT: 145 LBS | OXYGEN SATURATION: 97 % | HEART RATE: 80 BPM

## 2020-02-10 DIAGNOSIS — J40 BRONCHITIS: Primary | ICD-10-CM

## 2020-02-10 DIAGNOSIS — J40 BRONCHITIS, NOT SPECIFIED AS ACUTE OR CHRONIC: Primary | ICD-10-CM

## 2020-02-10 RX ORDER — PREDNISONE 20 MG/1
TABLET ORAL
Qty: 12 TAB | Refills: 0 | Status: SHIPPED | OUTPATIENT
Start: 2020-02-10 | End: 2020-02-24 | Stop reason: ALTCHOICE

## 2020-02-10 RX ORDER — ALBUTEROL SULFATE 90 UG/1
1 AEROSOL, METERED RESPIRATORY (INHALATION)
Qty: 1 INHALER | Refills: 0 | Status: SHIPPED | OUTPATIENT
Start: 2020-02-10

## 2020-02-10 NOTE — PROGRESS NOTES
HISTORY OF PRESENT ILLNESS  Ana Laura Clayton is a 62 y.o. female. HPI   Pt was last here 12/4/19. Pt is here for acute care. Pt c/o dry cough x 2 weeks   She states she feels foggy as well   Denies any fever, body aches, chills, ear pain, sore throat   Denies wheezing or trouble breathing   Has been using nyquil at night to sleep, states cough bothers her more throughout the day   Denies anyone being sick around her   Didn't take nyquil the last 2 nights   Will get cxr as pt reports hx of pna   Will give prednisone taper   Will give inhaler         There are no active problems to display for this patient. Current Outpatient Medications   Medication Sig Dispense Refill    sertraline (ZOLOFT) 50 mg tablet Take 1 Tab by mouth daily. 30 Tab 5    MULTIVITAMIN PO Take  by mouth.  cholecalciferol (VITAMIN D3) 1,000 unit cap Take  by mouth daily.  nystatin-triamcinolone (MYCOLOG II) topical cream Apply  to affected area two (2) times a day. Monday to Friday only 30 g 0     Past Surgical History:   Procedure Laterality Date    HX ORTHOPAEDIC      right arm      Lab Results   Component Value Date/Time    WBC 5.3 12/04/2019 10:58 AM    HGB 13.5 12/04/2019 10:58 AM    HCT 40.6 12/04/2019 10:58 AM    PLATELET 119 08/26/1732 10:58 AM    MCV 89 12/04/2019 10:58 AM     Lab Results   Component Value Date/Time    Cholesterol, total 167 12/04/2019 10:58 AM    HDL Cholesterol 45 12/04/2019 10:58 AM    LDL, calculated 110 (H) 12/04/2019 10:58 AM    Triglyceride 61 12/04/2019 10:58 AM     Lab Results   Component Value Date/Time    GFR est non-AA 97 12/04/2019 10:58 AM    GFR est  12/04/2019 10:58 AM    Creatinine 0.63 12/04/2019 10:58 AM    BUN 15 12/04/2019 10:58 AM    Sodium 139 12/04/2019 10:58 AM    Potassium 4.3 12/04/2019 10:58 AM    Chloride 102 12/04/2019 10:58 AM    CO2 21 12/04/2019 10:58 AM        Review of Systems   Constitutional: Negative for chills and fever.    HENT: Negative for congestion and sore throat. Respiratory: Positive for cough. Negative for shortness of breath and wheezing. Cardiovascular: Negative for chest pain. Physical Exam  Constitutional:       General: She is not in acute distress. Appearance: She is well-developed. She is not diaphoretic. HENT:      Head: Normocephalic and atraumatic. Right Ear: Tympanic membrane, ear canal and external ear normal.      Left Ear: Tympanic membrane, ear canal and external ear normal.      Mouth/Throat:      Mouth: Mucous membranes are moist.      Pharynx: Oropharynx is clear. No oropharyngeal exudate or posterior oropharyngeal erythema. Eyes:      General:         Right eye: No discharge. Left eye: No discharge. Conjunctiva/sclera: Conjunctivae normal.   Neck:      Musculoskeletal: Normal range of motion and neck supple. Cardiovascular:      Rate and Rhythm: Normal rate and regular rhythm. Heart sounds: Normal heart sounds. No murmur. No friction rub. No gallop. Pulmonary:      Effort: Pulmonary effort is normal. No respiratory distress. Breath sounds: Normal breath sounds. No wheezing or rales. Chest:      Chest wall: No tenderness. Musculoskeletal: Normal range of motion. General: No tenderness or deformity. Lymphadenopathy:      Cervical: No cervical adenopathy. Skin:     General: Skin is warm and dry. Coloration: Skin is not pale. Findings: No erythema or rash. Neurological:      Mental Status: She is alert and oriented to person, place, and time. Coordination: Coordination normal.   Psychiatric:         Mood and Affect: Mood normal.         Behavior: Behavior normal.         ASSESSMENT and PLAN    ICD-10-CM ICD-9-CM    1. Bronchitis                Will tx with prednisone taper no abx needed unless cxr shows pna albuterol prn  J40 490 XR CHEST PA LAT              Scribed by [facility] of [address], as dictated by Dr. Millicent Huggins.      Current diagnosis and concerns discussed with pt at length. Pt understands risks and benefits or current treatment plan and medications, and accepts the treatment and medication with any possible risks. Pt asks appropriate questions, which were answered. Pt was instructed to call with any concerns or problems. I have reviewed the note documented by the scribe. The services provided are my own.   The documentation is accurate

## 2020-02-24 ENCOUNTER — OFFICE VISIT (OUTPATIENT)
Dept: INTERNAL MEDICINE CLINIC | Age: 60
End: 2020-02-24

## 2020-02-24 VITALS
TEMPERATURE: 99 F | BODY MASS INDEX: 23.82 KG/M2 | WEIGHT: 148.2 LBS | HEART RATE: 77 BPM | OXYGEN SATURATION: 98 % | DIASTOLIC BLOOD PRESSURE: 77 MMHG | SYSTOLIC BLOOD PRESSURE: 129 MMHG | RESPIRATION RATE: 16 BRPM | HEIGHT: 66 IN

## 2020-02-24 DIAGNOSIS — J40 BRONCHITIS: Primary | ICD-10-CM

## 2020-02-24 LAB
FLUAV+FLUBV AG NOSE QL IA.RAPID: NEGATIVE POS/NEG
FLUAV+FLUBV AG NOSE QL IA.RAPID: NEGATIVE POS/NEG
VALID INTERNAL CONTROL?: YES

## 2020-02-24 NOTE — PROGRESS NOTES
HISTORY OF PRESENT ILLNESS  Maranda Sanon is a 62 y.o. female. HPI   Pt was last here 2/10/20. Pt is here for acute care. When pt was last here for dry cough gave her prednisone   She states this helped but cough restarted on Thursday   States it was worse on Friday and Saturday   Took mucinex for this which helped and cough has gotten better since then   States she feels very out of it and foggy   Denies wheezing or trouble breathing   Reviewed CXR 2/20: negative   Gave albuterol lov to use prn, states she has not needed to use this   Has a temp here today of 99   Denies body aches or headaches   Will get flu test           There are no active problems to display for this patient. Current Outpatient Medications   Medication Sig Dispense Refill    predniSONE (DELTASONE) 20 mg tablet 60mg po daily for 2days, 40mg po daily for 2 days, 20mg po daily for 2days then stop 12 Tab 0    albuterol (PROVENTIL HFA, VENTOLIN HFA, PROAIR HFA) 90 mcg/actuation inhaler Take 1 Puff by inhalation every six (6) hours as needed for Wheezing. 1 Inhaler 0    sertraline (ZOLOFT) 50 mg tablet Take 1 Tab by mouth daily. 30 Tab 5    MULTIVITAMIN PO Take  by mouth.  cholecalciferol (VITAMIN D3) 1,000 unit cap Take  by mouth daily.  nystatin-triamcinolone (MYCOLOG II) topical cream Apply  to affected area two (2) times a day.  Monday to Friday only 30 g 0     Past Surgical History:   Procedure Laterality Date    HX ORTHOPAEDIC      right arm      Lab Results   Component Value Date/Time    WBC 5.3 12/04/2019 10:58 AM    HGB 13.5 12/04/2019 10:58 AM    HCT 40.6 12/04/2019 10:58 AM    PLATELET 895 47/82/1029 10:58 AM    MCV 89 12/04/2019 10:58 AM     Lab Results   Component Value Date/Time    Cholesterol, total 167 12/04/2019 10:58 AM    HDL Cholesterol 45 12/04/2019 10:58 AM    LDL, calculated 110 (H) 12/04/2019 10:58 AM    Triglyceride 61 12/04/2019 10:58 AM     Lab Results   Component Value Date/Time    GFR est non-AA 97 12/04/2019 10:58 AM    GFR est  12/04/2019 10:58 AM    Creatinine 0.63 12/04/2019 10:58 AM    BUN 15 12/04/2019 10:58 AM    Sodium 139 12/04/2019 10:58 AM    Potassium 4.3 12/04/2019 10:58 AM    Chloride 102 12/04/2019 10:58 AM    CO2 21 12/04/2019 10:58 AM        Review of Systems   Constitutional: Positive for fever. Negative for malaise/fatigue. HENT: Negative for congestion, ear pain and sore throat. Respiratory: Positive for cough. Negative for shortness of breath and wheezing. Cardiovascular: Negative for chest pain. Neurological: Negative for headaches. Physical Exam  Constitutional:       General: She is not in acute distress. Appearance: She is well-developed. She is not diaphoretic. HENT:      Head: Normocephalic and atraumatic. Right Ear: Tympanic membrane, ear canal and external ear normal.      Left Ear: Tympanic membrane, ear canal and external ear normal.      Ears:      Comments: Erythema in both ears      Mouth/Throat:      Mouth: Mucous membranes are moist.      Pharynx: Oropharynx is clear. No oropharyngeal exudate or posterior oropharyngeal erythema. Eyes:      General:         Right eye: No discharge. Left eye: No discharge. Conjunctiva/sclera: Conjunctivae normal.   Neck:      Musculoskeletal: Normal range of motion and neck supple. Cardiovascular:      Rate and Rhythm: Normal rate and regular rhythm. Heart sounds: Normal heart sounds. No murmur. No friction rub. No gallop. Pulmonary:      Effort: Pulmonary effort is normal. No respiratory distress. Breath sounds: Normal breath sounds. No wheezing or rales. Chest:      Chest wall: No tenderness. Musculoskeletal: Normal range of motion. General: No tenderness or deformity. Lymphadenopathy:      Cervical: No cervical adenopathy. Skin:     General: Skin is warm and dry. Coloration: Skin is not pale. Findings: No erythema or rash.    Neurological: General: No focal deficit present. Mental Status: She is alert and oriented to person, place, and time. Coordination: Coordination normal.   Psychiatric:         Mood and Affect: Mood normal.         Behavior: Behavior normal.         ASSESSMENT and PLAN    ICD-10-CM ICD-9-CM    1. Bronchitis                  Flu neg sxs improved will treat with mucinex and flonase otc no abx needed discussed for progressive sxs such as body aches or fever to contact office would consider tamiflu  J40 490 AMB POC RAPID INFLUENZA TEST            Scribed by Mk Paez of [address], as dictated by Dr. Ra Quiroz. Current diagnosis and concerns discussed with pt at length. Pt understands risks and benefits or current treatment plan and medications, and accepts the treatment and medication with any possible risks. Pt asks appropriate questions, which were answered. Pt was instructed to call with any concerns or problems. I have reviewed the note documented by the scribe. The services provided are my own.   The documentation is accurate

## 2020-07-13 RX ORDER — SERTRALINE HYDROCHLORIDE 50 MG/1
TABLET, FILM COATED ORAL
Qty: 30 TAB | Refills: 5 | Status: SHIPPED | OUTPATIENT
Start: 2020-07-13 | End: 2020-10-26

## 2020-10-21 NOTE — PROGRESS NOTES
HISTORY OF PRESENT ILLNESS  Cammie Schmidt is a 62 y.o. female. HPI     Last here 20. Pt is here to f/u on chronic conditions/acute care   This is an established visit completed with telemedicine was completed with video assist  the patient acknowledges and agrees to this method of visitation doxyme     No home Bp readings, controlled in past 129/77 98/59 104/69  She will start monitoring     Wt is 150 lbs per pt - up 1 lb x lov    Her weight is within normal ranges      Reviewed labs    Ordered labs       Continues vit D OTC 1000U BID     Pt follows with derm at [facility]  Last visit was      Pt is taking an OTC sleep aid with melatonin in it      She c/o being overwhelmed and high anxiety x past several mos  She is trying to work parttime and she is trying to take care of her parents  She seems to get more worked up in the am  Pt is on zoloft 50 mg  for anxiety   Will increase this to 100 mg  Will give ativan prn for rare use      Of note, her mother is also my patient Slava Boo)     PREVENTIVE:  Colonoscopy: Dr. Dorene Bland, 8/10/2012; repeat 10 years  Pap:  at [facility], scheduled   Mammogram: 19 , negative, scheduled  Dexa: had one completed when she fractured her R arm  Tdap:   Pneumovax: 8/10/15  Shingrix: ordered 19   Flu shot: declines in the past, will get this year due now   Eye exam: , Dr. Otto Barraza cataract surg   and , scheduled   Lipids:  LDL 110  Hep C:  negative  EK17, nsr  A1c:  5.3    There are no active problems to display for this patient. Current Outpatient Medications   Medication Sig Dispense Refill    LORazepam (ATIVAN) 0.5 mg tablet Take 1 Tab by mouth nightly as needed for Anxiety. Max Daily Amount: 0.5 mg. 15 Tab 0    sertraline (ZOLOFT) 100 mg tablet Take 1 Tab by mouth daily.  30 Tab 1    albuterol (PROVENTIL HFA, VENTOLIN HFA, PROAIR HFA) 90 mcg/actuation inhaler Take 1 Puff by inhalation every six (6) hours as needed for Wheezing. 1 Inhaler 0    MULTIVITAMIN PO Take  by mouth.  cholecalciferol (VITAMIN D3) 1,000 unit cap Take  by mouth daily.  nystatin-triamcinolone (MYCOLOG II) topical cream Apply  to affected area two (2) times a day. Monday to Friday only 30 g 0     Past Surgical History:   Procedure Laterality Date    HX ORTHOPAEDIC      right arm      Lab Results   Component Value Date/Time    WBC 5.3 12/04/2019 10:58 AM    HGB 13.5 12/04/2019 10:58 AM    HCT 40.6 12/04/2019 10:58 AM    PLATELET 753 73/71/7175 10:58 AM    MCV 89 12/04/2019 10:58 AM     Lab Results   Component Value Date/Time    Cholesterol, total 167 12/04/2019 10:58 AM    HDL Cholesterol 45 12/04/2019 10:58 AM    LDL, calculated 110 (H) 12/04/2019 10:58 AM    Triglyceride 61 12/04/2019 10:58 AM     Lab Results   Component Value Date/Time    GFR est non-AA 97 12/04/2019 10:58 AM    GFR est  12/04/2019 10:58 AM    Creatinine 0.63 12/04/2019 10:58 AM    BUN 15 12/04/2019 10:58 AM    Sodium 139 12/04/2019 10:58 AM    Potassium 4.3 12/04/2019 10:58 AM    Chloride 102 12/04/2019 10:58 AM    CO2 21 12/04/2019 10:58 AM        Review of Systems   Respiratory: Negative for shortness of breath. Cardiovascular: Negative for chest pain. Psychiatric/Behavioral: The patient is nervous/anxious. Physical Exam  Constitutional:       General: She is not in acute distress. Appearance: Normal appearance. She is not ill-appearing, toxic-appearing or diaphoretic. HENT:      Head: Normocephalic and atraumatic. Eyes:      General:         Right eye: No discharge. Left eye: No discharge. Conjunctiva/sclera: Conjunctivae normal.   Pulmonary:      Effort: No respiratory distress. Neurological:      Mental Status: She is alert and oriented to person, place, and time. Mental status is at baseline.       Gait: Gait normal.   Psychiatric:         Mood and Affect: Mood normal.         Behavior: Behavior normal.         ASSESSMENT and PLAN    ICD-10-CM ICD-9-CM    1. Anxiety         Progressive anxiety    We will increase Zoloft 100 mg    We will give a limited amount of Ativan to use as needed for acute stress     F41.9 300.00 LORazepam (ATIVAN) 0.5 mg tablet      LIPID PANEL      METABOLIC PANEL, COMPREHENSIVE      CBC W/O DIFF      HEMOGLOBIN A1C WITH EAG      TSH 3RD GENERATION   2. Physical exam  Z00.00 V70.9 LIPID PANEL   Due for labs ordered    Blood pressure is normally well controlled she will start monitoring    Weight is in the normal range    She will get flu shot    Pelvic and mammogram are due next month    [facility] is up-to-date   METABOLIC PANEL, COMPREHENSIVE      CBC W/O DIFF      HEMOGLOBIN A1C WITH EAG      TSH 3RD GENERATION           Scribed by Stefan Rocha of [facility], as dictated by Dr. Dave Mcdowell. Current diagnosis and concerns discussed with pt at length. Pt understands risks and benefits or current treatment plan and medications, and accepts the treatment and medication with any possible risks. Pt asks appropriate questions, which were answered. Pt was instructed to call with any concerns or problems. I have reviewed the note documented by the scribe. The services provided are my own. The documentation is accurate     Ya Petersen, who was evaluated through a synchronous (real-time) audio-video encounter, and/or her healthcare decision maker, is aware that it is a billable service, with coverage as determined by her insurance carrier. She provided verbal consent to proceed: Yes, and patient identification was verified. It was conducted pursuant to the emergency declaration under the [address] [place] [address], [address] [place] [address] authority and the [employer] Act. A caregiver was present when appropriate. Ability to conduct physical exam was limited. I was at home. The patient was at home.

## 2020-10-26 ENCOUNTER — VIRTUAL VISIT (OUTPATIENT)
Dept: INTERNAL MEDICINE CLINIC | Age: 60
End: 2020-10-26
Payer: COMMERCIAL

## 2020-10-26 DIAGNOSIS — F41.9 ANXIETY: Primary | ICD-10-CM

## 2020-10-26 DIAGNOSIS — Z00.00 PHYSICAL EXAM: ICD-10-CM

## 2020-10-26 PROCEDURE — 99213 OFFICE O/P EST LOW 20 MIN: CPT | Performed by: INTERNAL MEDICINE

## 2020-10-26 RX ORDER — LORAZEPAM 0.5 MG/1
0.5 TABLET ORAL
Qty: 15 TAB | Refills: 0 | Status: SHIPPED | OUTPATIENT
Start: 2020-10-26

## 2020-10-26 RX ORDER — SERTRALINE HYDROCHLORIDE 100 MG/1
100 TABLET, FILM COATED ORAL DAILY
Qty: 30 TAB | Refills: 1 | Status: SHIPPED | OUTPATIENT
Start: 2020-10-26 | End: 2020-12-08 | Stop reason: SDUPTHER

## 2020-11-02 ENCOUNTER — TELEPHONE (OUTPATIENT)
Dept: INTERNAL MEDICINE CLINIC | Age: 60
End: 2020-11-02

## 2020-11-02 ENCOUNTER — TRANSCRIBE ORDER (OUTPATIENT)
Dept: SCHEDULING | Age: 60
End: 2020-11-02

## 2020-11-02 DIAGNOSIS — Z12.31 VISIT FOR SCREENING MAMMOGRAM: Primary | ICD-10-CM

## 2020-11-02 NOTE — TELEPHONE ENCOUNTER
Patient states she had a Virtual Visit Last week, 10/26/20 with Dr. Vanessa Worthington & was advised to call in with Blood Pressure Reading. Patient reports \"BP reading on 10/26/20 day of Virtual Visit was 120/68 with Pulse rate of 69\". Please call to advise Plan of Care.  Thank you

## 2020-11-03 NOTE — TELEPHONE ENCOUNTER
Left vm for pt that per Dr. Dc Balderrama, BP is fine and no additional tx/rx is needed at this time. Callback prn.

## 2020-11-03 NOTE — TELEPHONE ENCOUNTER
MD Daniella Mckenzie, CASEYN    Caller: Unspecified (Yesterday,  8:31 AM)               That blood pressure reading is fine no additional Rx

## 2020-11-06 LAB
ALBUMIN SERPL-MCNC: 4.6 G/DL (ref 3.8–4.8)
ALBUMIN/GLOB SERPL: 2.1 {RATIO} (ref 1.2–2.2)
ALP SERPL-CCNC: 71 IU/L (ref 39–117)
ALT SERPL-CCNC: 8 IU/L (ref 0–32)
AST SERPL-CCNC: 16 IU/L (ref 0–40)
BILIRUB SERPL-MCNC: 0.6 MG/DL (ref 0–1.2)
BUN SERPL-MCNC: 16 MG/DL (ref 8–27)
BUN/CREAT SERPL: 27 (ref 12–28)
CALCIUM SERPL-MCNC: 9.1 MG/DL (ref 8.7–10.3)
CHLORIDE SERPL-SCNC: 105 MMOL/L (ref 96–106)
CHOLEST SERPL-MCNC: 165 MG/DL (ref 100–199)
CO2 SERPL-SCNC: 23 MMOL/L (ref 20–29)
CREAT SERPL-MCNC: 0.59 MG/DL (ref 0.57–1)
ERYTHROCYTE [DISTWIDTH] IN BLOOD BY AUTOMATED COUNT: 12.7 % (ref 11.7–15.4)
EST. AVERAGE GLUCOSE BLD GHB EST-MCNC: 103 MG/DL
GLOBULIN SER CALC-MCNC: 2.2 G/DL (ref 1.5–4.5)
GLUCOSE SERPL-MCNC: 86 MG/DL (ref 65–99)
HBA1C MFR BLD: 5.2 % (ref 4.8–5.6)
HCT VFR BLD AUTO: 40.8 % (ref 34–46.6)
HDLC SERPL-MCNC: 46 MG/DL
HGB BLD-MCNC: 13.5 G/DL (ref 11.1–15.9)
LDLC SERPL CALC-MCNC: 106 MG/DL (ref 0–99)
MCH RBC QN AUTO: 29.7 PG (ref 26.6–33)
MCHC RBC AUTO-ENTMCNC: 33.1 G/DL (ref 31.5–35.7)
MCV RBC AUTO: 90 FL (ref 79–97)
PLATELET # BLD AUTO: 183 X10E3/UL (ref 150–450)
POTASSIUM SERPL-SCNC: 4.5 MMOL/L (ref 3.5–5.2)
PROT SERPL-MCNC: 6.8 G/DL (ref 6–8.5)
RBC # BLD AUTO: 4.55 X10E6/UL (ref 3.77–5.28)
SODIUM SERPL-SCNC: 140 MMOL/L (ref 134–144)
TRIGL SERPL-MCNC: 68 MG/DL (ref 0–149)
TSH SERPL DL<=0.005 MIU/L-ACNC: 1.46 UIU/ML (ref 0.45–4.5)
VLDLC SERPL CALC-MCNC: 13 MG/DL (ref 5–40)
WBC # BLD AUTO: 5.3 X10E3/UL (ref 3.4–10.8)

## 2020-12-04 NOTE — PROGRESS NOTES
HISTORY OF PRESENT ILLNESS  Jorge Adler is a 56 y.o. female. HPI   Last here vv 10/26/20. Pt is here to f/u on chronic conditions     Her brother wants to come visit their parents who are living with her, she doesn't want him to come due to covid   Discussed this is what she should due with the increasing covid numbers  Discussed any gatherings should be outside      BP today is 127/75     Wt is 150 lbs - stable x lov  Her weight is within normal ranges      Reviewed labs       Continues vit D OTC 1000U BID     Pt follows with derm at [facility]  Last visit was      Pt is taking an OTC sleep aid with melatonin in it      Lov, she was having increased anxiety  Lov, increased zoloft from 50 mg to 100 mg - this is working well, happy with dose   Gave ativan prn for rare use -- has not needed     Of note, her mother is also my patient Jian Cancer)     PREVENTIVE:  Colonoscopy: Dr. Fatuma Mosquera, 8/10/2012; repeat 10 years  [address], scheduled later this month   Mammogram: 19 , negative, scheduled 20  Dexa: had one completed when she fractured her R arm  Tdap:   Pneumovax: 8/10/15  Shingrix: ordered 19, reminded  Flu shot: declines in the past, will get today 20  Eye exam: , Dr. Molina Line surg   and  , scheduled   Lipids:  LDL 106  Hep C:  negative  EK17, nsr  A1c:  5.3  5.2        There are no active problems to display for this patient. Current Outpatient Medications   Medication Sig Dispense Refill    sertraline (ZOLOFT) 100 mg tablet Take 1 Tab by mouth daily. 90 Tab 1    LORazepam (ATIVAN) 0.5 mg tablet Take 1 Tab by mouth nightly as needed for Anxiety. Max Daily Amount: 0.5 mg. 15 Tab 0    albuterol (PROVENTIL HFA, VENTOLIN HFA, PROAIR HFA) 90 mcg/actuation inhaler Take 1 Puff by inhalation every six (6) hours as needed for Wheezing.  1 Inhaler 0    MULTIVITAMIN PO Take  by mouth.      cholecalciferol (VITAMIN D3) 1,000 unit cap Take  by mouth daily.  nystatin-triamcinolone (MYCOLOG II) topical cream Apply  to affected area two (2) times a day. Monday to Friday only 30 g 0     Past Surgical History:   Procedure Laterality Date    HX ORTHOPAEDIC      right arm      Lab Results   Component Value Date/Time    WBC 5.3 11/05/2020 09:21 AM    HGB 13.5 11/05/2020 09:21 AM    HCT 40.8 11/05/2020 09:21 AM    PLATELET 740 99/90/1858 09:21 AM    MCV 90 11/05/2020 09:21 AM     Lab Results   Component Value Date/Time    Cholesterol, total 165 11/05/2020 09:21 AM    HDL Cholesterol 46 11/05/2020 09:21 AM    LDL, calculated 110 (H) 12/04/2019 10:58 AM    LDL Chol Calc (NIH) 106 (H) 11/05/2020 09:21 AM    Triglyceride 68 11/05/2020 09:21 AM     Lab Results   Component Value Date/Time    GFR est non-AA 99 11/05/2020 09:21 AM    GFR est  11/05/2020 09:21 AM    Creatinine 0.59 11/05/2020 09:21 AM    BUN 16 11/05/2020 09:21 AM    Sodium 140 11/05/2020 09:21 AM    Potassium 4.5 11/05/2020 09:21 AM    Chloride 105 11/05/2020 09:21 AM    CO2 23 11/05/2020 09:21 AM        Review of Systems   Respiratory: Negative for shortness of breath. Cardiovascular: Negative for chest pain. Physical Exam  Constitutional:       General: She is not in acute distress. Appearance: Normal appearance. She is not ill-appearing, toxic-appearing or diaphoretic. HENT:      Head: Normocephalic and atraumatic. Right Ear: External ear normal.      Left Ear: External ear normal.   Eyes:      General:         Right eye: No discharge. Left eye: No discharge. Conjunctiva/sclera: Conjunctivae normal.      Pupils: Pupils are equal, round, and reactive to light. Neck:      Musculoskeletal: Normal range of motion and neck supple. Vascular: No carotid bruit. Cardiovascular:      Rate and Rhythm: Normal rate and regular rhythm. Pulses: Normal pulses.       Heart sounds: Normal heart sounds. No murmur. No friction rub. No gallop. Pulmonary:      Effort: No respiratory distress. Breath sounds: Normal breath sounds. No wheezing or rales. Chest:      Chest wall: No tenderness. Abdominal:      General: Abdomen is flat. There is no distension. Palpations: Abdomen is soft. There is no mass. Tenderness: There is no abdominal tenderness. There is no guarding or rebound. Hernia: No hernia is present. Musculoskeletal: Normal range of motion. Right lower leg: No edema. Left lower leg: No edema. Skin:     General: Skin is warm and dry. Neurological:      Mental Status: She is alert and oriented to person, place, and time. Mental status is at baseline. Coordination: Coordination normal.      Gait: Gait normal.   Psychiatric:         Mood and Affect: Mood normal.         Behavior: Behavior normal.         ASSESSMENT and PLAN    ICD-10-CM ICD-9-CM    1. Physical exam, annual       Normal weight and blood pressure    Colonoscopy up-to-date    Pelvic and mammogram were completed will get reports review    Flu shot today    We will get Shingrix at her local pharmacy    Eye exam will be due this spring           Z00.00 V70.0     2 anxiety and depression controlled on Zoloft 100 mg  Depression screen reviewed and negative      Scribed by Jaki Marinelli of [address] [place] [address] [address], as dictated by Dr. Raheem Klein. Current diagnosis and concerns discussed with pt at length. Pt understands risks and benefits or current treatment plan and medications, and accepts the treatment and medication with any possible risks. Pt asks appropriate questions, which were answered. Pt was instructed to call with any concerns or problems. I have reviewed the note documented by the scribe. The services provided are my own.   The documentation is accurate

## 2020-12-08 ENCOUNTER — OFFICE VISIT (OUTPATIENT)
Dept: INTERNAL MEDICINE CLINIC | Age: 60
End: 2020-12-08
Payer: COMMERCIAL

## 2020-12-08 VITALS
DIASTOLIC BLOOD PRESSURE: 75 MMHG | SYSTOLIC BLOOD PRESSURE: 127 MMHG | BODY MASS INDEX: 24.2 KG/M2 | RESPIRATION RATE: 16 BRPM | OXYGEN SATURATION: 99 % | TEMPERATURE: 97.8 F | HEIGHT: 66 IN | HEART RATE: 75 BPM | WEIGHT: 150.6 LBS

## 2020-12-08 DIAGNOSIS — Z00.00 PHYSICAL EXAM, ANNUAL: Primary | ICD-10-CM

## 2020-12-08 DIAGNOSIS — Z23 NEEDS FLU SHOT: ICD-10-CM

## 2020-12-08 PROCEDURE — 90686 IIV4 VACC NO PRSV 0.5 ML IM: CPT | Performed by: INTERNAL MEDICINE

## 2020-12-08 PROCEDURE — 90471 IMMUNIZATION ADMIN: CPT | Performed by: INTERNAL MEDICINE

## 2020-12-08 PROCEDURE — 99396 PREV VISIT EST AGE 40-64: CPT | Performed by: INTERNAL MEDICINE

## 2020-12-08 RX ORDER — SERTRALINE HYDROCHLORIDE 100 MG/1
100 TABLET, FILM COATED ORAL DAILY
Qty: 90 TAB | Refills: 1 | Status: SHIPPED | OUTPATIENT
Start: 2020-12-08 | End: 2021-05-25

## 2020-12-08 NOTE — PROGRESS NOTES
Bob Cherry is a 56 y.o. female who presents for routine immunizations. She denies any symptoms , reactions or allergies that would exclude them from being immunized today. Risks and adverse reactions were discussed and the VIS was given to them. All questions were addressed. She was observed for 5 min post injection. There were no reactions observed.     Laurel Channel

## 2020-12-10 ENCOUNTER — TRANSCRIBE ORDER (OUTPATIENT)
Dept: SCHEDULING | Age: 60
End: 2020-12-10

## 2020-12-10 ENCOUNTER — HOSPITAL ENCOUNTER (OUTPATIENT)
Dept: MAMMOGRAPHY | Age: 60
Discharge: HOME | End: 2020-12-10
Attending: INTERNAL MEDICINE
Payer: COMMERCIAL

## 2020-12-10 DIAGNOSIS — Z12.31 VISIT FOR SCREENING MAMMOGRAM: Primary | ICD-10-CM

## 2020-12-10 DIAGNOSIS — Z12.31 VISIT FOR SCREENING MAMMOGRAM: ICD-10-CM

## 2020-12-10 PROCEDURE — 77067 SCR MAMMO BI INCL CAD: CPT

## 2021-05-25 RX ORDER — SERTRALINE HYDROCHLORIDE 100 MG/1
TABLET, FILM COATED ORAL
Qty: 90 TABLET | Refills: 1 | Status: SHIPPED | OUTPATIENT
Start: 2021-05-25 | End: 2021-11-25

## 2021-09-13 ENCOUNTER — TRANSCRIBE ORDER (OUTPATIENT)
Dept: SCHEDULING | Age: 61
End: 2021-09-13

## 2021-09-13 DIAGNOSIS — Z12.31 SCREENING MAMMOGRAM FOR HIGH-RISK PATIENT: Primary | ICD-10-CM

## 2021-12-13 NOTE — PROGRESS NOTES
HISTORY OF PRESENT ILLNESS  Jil Fournier is a 59 y.o. female. HPI     Last here 20. Pt is here to f/u on chronic conditions. She c/o difficulty sleeping  Her mind races  Has been trying to take OTC sleep med  Will order hydroxyzine to use prn     BP today is 137/70     Wt today is 154 lbs, up 4 lbs x lov   Her weight is within normal ranges      Reviewed labs       Continues vit D OTC 1000U BID     Pt follows with derm at [facility]  Last visit was      Pt is taking an OTC sleep aid with melatonin in it      Lov, she was having increased anxiety  Lov, increased zoloft from 50 mg to 100 mg - this is working well, happy with dose   Gave ativan prn for rare use -- has not needed     Reviewed mammo 12/10/21: negative    Of note, her mother is also my patient Ramses Vickers)     PREVENTIVE:  Colonoscopy: Dr. Chuck Shell, 8/10/2012; repeat 10 years ordered  Pap: [facility],  Mammogram: 12/10/20 negative, scheduled 22  Dexa: had one completed when she fractured her R arm  Tdap:   Pneumovax: 8/10/15  Shingrix: ordered 19, reminded  Flu shot: 21  Eye exam:  mukesh  Lipids:  LDL 106  Hep C:  negative  EK17, nsr  A1c:  5.3  5.2  Covid: three doses [employer])    Patient Active Problem List    Diagnosis Date Noted    Anxiety 2021     Current Outpatient Medications   Medication Sig Dispense Refill    hydrOXYzine HCL (ATARAX) 25 mg tablet Take 1 Tablet by mouth nightly as needed for Sleep. 30 Tablet 1    sertraline (ZOLOFT) 100 mg tablet TAKE 1 TABLET BY MOUTH DAILY 30 Tablet 0    LORazepam (ATIVAN) 0.5 mg tablet Take 1 Tab by mouth nightly as needed for Anxiety. Max Daily Amount: 0.5 mg. 15 Tab 0    albuterol (PROVENTIL HFA, VENTOLIN HFA, PROAIR HFA) 90 mcg/actuation inhaler Take 1 Puff by inhalation every six (6) hours as needed for Wheezing. 1 Inhaler 0    MULTIVITAMIN PO Take  by mouth.       cholecalciferol (VITAMIN D3) 1,000 unit cap Take  by mouth daily.  nystatin-triamcinolone (MYCOLOG II) topical cream Apply  to affected area two (2) times a day. Monday to Friday only 30 g 0     Past Surgical History:   Procedure Laterality Date    HX ORTHOPAEDIC      right arm      Lab Results   Component Value Date/Time    WBC 5.3 11/05/2020 09:21 AM    HGB 13.5 11/05/2020 09:21 AM    HCT 40.8 11/05/2020 09:21 AM    PLATELET 492 12/56/2050 09:21 AM    MCV 90 11/05/2020 09:21 AM     Lab Results   Component Value Date/Time    Cholesterol, total 165 11/05/2020 09:21 AM    HDL Cholesterol 46 11/05/2020 09:21 AM    LDL, calculated 106 (H) 11/05/2020 09:21 AM    LDL, calculated 110 (H) 12/04/2019 10:58 AM    Triglyceride 68 11/05/2020 09:21 AM     Lab Results   Component Value Date/Time    GFR est non-AA 99 11/05/2020 09:21 AM    GFR est  11/05/2020 09:21 AM    Creatinine 0.59 11/05/2020 09:21 AM    BUN 16 11/05/2020 09:21 AM    Sodium 140 11/05/2020 09:21 AM    Potassium 4.5 11/05/2020 09:21 AM    Chloride 105 11/05/2020 09:21 AM    CO2 23 11/05/2020 09:21 AM        Review of Systems   Respiratory: Negative for shortness of breath. Cardiovascular: Negative for chest pain. Physical Exam  Constitutional:       General: She is not in acute distress. Appearance: Normal appearance. She is not ill-appearing, toxic-appearing or diaphoretic. HENT:      Head: Normocephalic and atraumatic. Right Ear: External ear normal.      Left Ear: External ear normal.   Eyes:      General:         Right eye: No discharge. Left eye: No discharge. Conjunctiva/sclera: Conjunctivae normal.      Pupils: Pupils are equal, round, and reactive to light. Cardiovascular:      Rate and Rhythm: Normal rate and regular rhythm. Heart sounds: No murmur heard. No friction rub. No gallop. Pulmonary:      Effort: No respiratory distress. Breath sounds: Normal breath sounds. No wheezing or rales.    Chest: Chest wall: No tenderness. Abdominal:      General: Abdomen is flat. There is no distension. Palpations: Abdomen is soft. There is no mass. Tenderness: There is no abdominal tenderness. There is no guarding or rebound. Hernia: No hernia is present. Musculoskeletal:         General: Normal range of motion. Cervical back: Normal range of motion and neck supple. Skin:     General: Skin is warm and dry. Neurological:      Mental Status: She is alert and oriented to person, place, and time. Mental status is at baseline. Coordination: Coordination normal.      Gait: Gait normal.   Psychiatric:         Mood and Affect: Mood normal.         Behavior: Behavior normal.         ASSESSMENT and PLAN    ICD-10-CM ICD-9-CM    1. Physical exam, annual      Z00.00 V70.0 REFERRAL TO GASTROENTEROLOGY      LIPID PANEL   Flu shot today    She will get shingles later    Colonoscopy due in August ordered    [facility]    Eye exam up-to-date    Covid vaccine up-to-date    Labs ordered   METABOLIC PANEL, COMPREHENSIVE      CBC W/O DIFF      TSH 3RD GENERATION      HEMOGLOBIN A1C WITH EAG      LIPID PANEL      METABOLIC PANEL, COMPREHENSIVE      CBC W/O DIFF      TSH 3RD GENERATION      HEMOGLOBIN A1C WITH EAG   2. Anxiety     Controlled on Zoloft     F41.9 300.00    3. Primary insomnia     Discussed sleep hygiene we will give hydroxyzine to use as needed F51.01 307.42         Depression screen reviewed and negative     Scribed by Jose Corrales, as dictated by Dr. Emerson Officer. Current diagnosis and concerns discussed with pt at length. Pt understands risks and benefits or current treatment plan and medications, and accepts the treatment and medication with any possible risks. Pt asks appropriate questions, which were answered. Pt was instructed to call with any concerns or problems. I have reviewed the note documented by the scribe.   The services provided are my own.   The documentation is accurate

## 2021-12-14 ENCOUNTER — OFFICE VISIT (OUTPATIENT)
Dept: INTERNAL MEDICINE CLINIC | Age: 61
End: 2021-12-14
Payer: COMMERCIAL

## 2021-12-14 VITALS
HEART RATE: 80 BPM | BODY MASS INDEX: 22.93 KG/M2 | DIASTOLIC BLOOD PRESSURE: 70 MMHG | SYSTOLIC BLOOD PRESSURE: 137 MMHG | OXYGEN SATURATION: 98 % | WEIGHT: 154.8 LBS | TEMPERATURE: 98.2 F | RESPIRATION RATE: 16 BRPM | HEIGHT: 69 IN

## 2021-12-14 DIAGNOSIS — Z23 NEEDS FLU SHOT: ICD-10-CM

## 2021-12-14 DIAGNOSIS — Z00.00 PHYSICAL EXAM, ANNUAL: Primary | ICD-10-CM

## 2021-12-14 DIAGNOSIS — F41.9 ANXIETY: ICD-10-CM

## 2021-12-14 DIAGNOSIS — F51.01 PRIMARY INSOMNIA: ICD-10-CM

## 2021-12-14 PROCEDURE — 90471 IMMUNIZATION ADMIN: CPT | Performed by: INTERNAL MEDICINE

## 2021-12-14 PROCEDURE — 99396 PREV VISIT EST AGE 40-64: CPT | Performed by: INTERNAL MEDICINE

## 2021-12-14 PROCEDURE — 90686 IIV4 VACC NO PRSV 0.5 ML IM: CPT | Performed by: INTERNAL MEDICINE

## 2021-12-14 RX ORDER — HYDROXYZINE 25 MG/1
25 TABLET, FILM COATED ORAL
Qty: 30 TABLET | Refills: 1 | Status: SHIPPED | OUTPATIENT
Start: 2021-12-14

## 2021-12-15 LAB
ALBUMIN SERPL-MCNC: 4.7 G/DL (ref 3.8–4.8)
ALBUMIN/GLOB SERPL: 1.9 {RATIO} (ref 1.2–2.2)
ALP SERPL-CCNC: 67 IU/L (ref 44–121)
ALT SERPL-CCNC: 10 IU/L (ref 0–32)
AST SERPL-CCNC: 15 IU/L (ref 0–40)
BILIRUB SERPL-MCNC: 0.5 MG/DL (ref 0–1.2)
BUN SERPL-MCNC: 17 MG/DL (ref 8–27)
BUN/CREAT SERPL: 26 (ref 12–28)
CALCIUM SERPL-MCNC: 9.2 MG/DL (ref 8.7–10.3)
CHLORIDE SERPL-SCNC: 102 MMOL/L (ref 96–106)
CHOLEST SERPL-MCNC: 197 MG/DL (ref 100–199)
CO2 SERPL-SCNC: 24 MMOL/L (ref 20–29)
CREAT SERPL-MCNC: 0.65 MG/DL (ref 0.57–1)
ERYTHROCYTE [DISTWIDTH] IN BLOOD BY AUTOMATED COUNT: 12.5 % (ref 11.7–15.4)
EST. AVERAGE GLUCOSE BLD GHB EST-MCNC: 108 MG/DL
GLOBULIN SER CALC-MCNC: 2.5 G/DL (ref 1.5–4.5)
GLUCOSE SERPL-MCNC: 89 MG/DL (ref 65–99)
HBA1C MFR BLD: 5.4 % (ref 4.8–5.6)
HCT VFR BLD AUTO: 42.3 % (ref 34–46.6)
HDLC SERPL-MCNC: 38 MG/DL
HGB BLD-MCNC: 13.9 G/DL (ref 11.1–15.9)
LDLC SERPL CALC-MCNC: 136 MG/DL (ref 0–99)
MCH RBC QN AUTO: 29.8 PG (ref 26.6–33)
MCHC RBC AUTO-ENTMCNC: 32.9 G/DL (ref 31.5–35.7)
MCV RBC AUTO: 91 FL (ref 79–97)
PLATELET # BLD AUTO: 198 X10E3/UL (ref 150–450)
POTASSIUM SERPL-SCNC: 4.5 MMOL/L (ref 3.5–5.2)
PROT SERPL-MCNC: 7.2 G/DL (ref 6–8.5)
RBC # BLD AUTO: 4.66 X10E6/UL (ref 3.77–5.28)
SODIUM SERPL-SCNC: 139 MMOL/L (ref 134–144)
TRIGL SERPL-MCNC: 126 MG/DL (ref 0–149)
TSH SERPL DL<=0.005 MIU/L-ACNC: 1.33 UIU/ML (ref 0.45–4.5)
VLDLC SERPL CALC-MCNC: 23 MG/DL (ref 5–40)
WBC # BLD AUTO: 6.2 X10E3/UL (ref 3.4–10.8)

## 2021-12-26 RX ORDER — SERTRALINE HYDROCHLORIDE 100 MG/1
TABLET, FILM COATED ORAL
Qty: 30 TABLET | Refills: 0 | Status: SHIPPED | OUTPATIENT
Start: 2021-12-26 | End: 2022-01-29

## 2022-01-25 ENCOUNTER — HOSPITAL ENCOUNTER (OUTPATIENT)
Dept: MAMMOGRAPHY | Age: 62
Discharge: HOME | End: 2022-01-25
Attending: INTERNAL MEDICINE
Payer: COMMERCIAL

## 2022-01-25 DIAGNOSIS — Z12.31 SCREENING MAMMOGRAM FOR HIGH-RISK PATIENT: ICD-10-CM

## 2022-01-25 PROCEDURE — 77063 BREAST TOMOSYNTHESIS BI: CPT

## 2022-01-29 RX ORDER — SERTRALINE HYDROCHLORIDE 100 MG/1
TABLET, FILM COATED ORAL
Qty: 30 TABLET | Refills: 0 | Status: SHIPPED | OUTPATIENT
Start: 2022-01-29 | End: 2022-03-07

## 2022-03-19 PROBLEM — F41.9 ANXIETY: Status: ACTIVE | Noted: 2021-12-14

## 2022-09-09 RX ORDER — SERTRALINE HYDROCHLORIDE 100 MG/1
TABLET, FILM COATED ORAL
Qty: 90 TABLET | Refills: 1 | Status: SHIPPED | OUTPATIENT
Start: 2022-09-09

## 2022-12-14 NOTE — PROGRESS NOTES
HISTORY OF PRESENT ILLNESS  Gavin Bearden is a 57 y.o. female. HPI  Last here 21. Pt is here to f/u on chronic conditions. BP today is 135/75      Wt today is 146 lbs, down 8 lbs since lov  Her weight is within normal ranges      Reviewed labs   Ordered labs      Continues vit D OTC 1000U BID     Pt follows with derm at [facility]  Last visit was      She has a hx of anxiety  Taking zoloft 100 mg  States she had weaned down to 50 mg but since her  passed away last week from lung cancer she has been back at 100 mg   Will stay at higher dose for time being  Gave ativan prn for rare use -- has not needed    She has hydroxyzine to use PRN for sleep  She was previously taking an OTC sleep medication      Recall her  passed away last week from lung cancer      Of note, her mother is also my patient Niecy Lawson)    Reviewed mammo 22: neg       PREVENTIVE:  Colonoscopy: Dr. Artelia Halsted, 8/10/2012; repeat 10 years ordered again today overdue  Pap: [address],    Mammogram: 22 neg  Dexa: had one completed when she fractured her R arm  Tdap:   Pneumovax: 8/10/15  Shingrix: ordered 19, will hold off for today she may consider in the future  Flu shot: 21, will get today 22  Eye exam:  Dr. Redding Man  Lipids:    Hep C:  negative  EK17, nsr  A1c:  5.3  5.2  5.4  Covid: three doses [employer]), discussed can get bivalent booster at local pharmacy     Patient Active Problem List    Diagnosis Date Noted    Anxiety 2021     Current Outpatient Medications   Medication Sig Dispense Refill    sertraline (ZOLOFT) 100 mg tablet TAKE 1 TABLET BY MOUTH DAILY 90 Tablet 1    hydrOXYzine HCL (ATARAX) 25 mg tablet Take 1 Tablet by mouth nightly as needed for Sleep. 30 Tablet 1    LORazepam (ATIVAN) 0.5 mg tablet Take 1 Tab by mouth nightly as needed for Anxiety.  Max Daily Amount: 0.5 mg. 15 Tab 0 albuterol (PROVENTIL HFA, VENTOLIN HFA, PROAIR HFA) 90 mcg/actuation inhaler Take 1 Puff by inhalation every six (6) hours as needed for Wheezing. 1 Inhaler 0    MULTIVITAMIN PO Take  by mouth. cholecalciferol (VITAMIN D3) 1,000 unit cap Take  by mouth daily. nystatin-triamcinolone (MYCOLOG II) topical cream Apply  to affected area two (2) times a day. Monday to Friday only 30 g 0     Past Surgical History:   Procedure Laterality Date    HX ORTHOPAEDIC      right arm      Lab Results   Component Value Date/Time    WBC 6.2 12/14/2021 12:00 AM    HGB 13.9 12/14/2021 12:00 AM    HCT 42.3 12/14/2021 12:00 AM    PLATELET 496 48/27/3671 12:00 AM    MCV 91 12/14/2021 12:00 AM     Lab Results   Component Value Date/Time    Cholesterol, total 197 12/14/2021 12:00 AM    HDL Cholesterol 38 (L) 12/14/2021 12:00 AM    LDL, calculated 136 (H) 12/14/2021 12:00 AM    LDL, calculated 110 (H) 12/04/2019 10:58 AM    Triglyceride 126 12/14/2021 12:00 AM     Lab Results   Component Value Date/Time    GFR est non-AA 95 12/14/2021 12:00 AM    GFR est  12/14/2021 12:00 AM    Creatinine 0.65 12/14/2021 12:00 AM    BUN 17 12/14/2021 12:00 AM    Sodium 139 12/14/2021 12:00 AM    Potassium 4.5 12/14/2021 12:00 AM    Chloride 102 12/14/2021 12:00 AM    CO2 24 12/14/2021 12:00 AM        Review of Systems   Respiratory:  Negative for shortness of breath. Cardiovascular:  Negative for chest pain. Physical Exam  Constitutional:       General: She is not in acute distress. Appearance: Normal appearance. She is not ill-appearing, toxic-appearing or diaphoretic. HENT:      Head: Normocephalic and atraumatic. Right Ear: External ear normal.      Left Ear: External ear normal.   Eyes:      General:         Right eye: No discharge. Left eye: No discharge. Conjunctiva/sclera: Conjunctivae normal.      Pupils: Pupils are equal, round, and reactive to light. Neck:      Vascular: No carotid bruit. Cardiovascular:      Rate and Rhythm: Normal rate and regular rhythm. Heart sounds: No murmur heard. No friction rub. No gallop. Pulmonary:      Effort: No respiratory distress. Breath sounds: Normal breath sounds. No wheezing or rales. Chest:      Chest wall: No tenderness. Musculoskeletal:         General: Normal range of motion. Cervical back: Normal.   Skin:     General: Skin is warm and dry. Neurological:      General: No focal deficit present. Mental Status: She is alert and oriented to person, place, and time. Mental status is at baseline. Coordination: Coordination normal.      Gait: Gait normal.   Psychiatric:         Mood and Affect: Mood normal.         Behavior: Behavior normal.       ASSESSMENT and PLAN    ICD-10-CM ICD-9-CM    1. Physical exam, annual  Z00.00 V70.0 JOE 3D MERRITT W MAMMO BI SCREENING INCL CAD      LIPID PANEL   Normal weight and blood pressure her weight is back to her baseline weight she had gained weight last year    Labs ordered    Mammogram will be due next month    Will do flu shot today she will do shingles vaccine at another time in the future   METABOLIC PANEL, COMPREHENSIVE      HEMOGLOBIN A1C WITH EAG   Colonoscopy due referral placed again   TSH 3RD GENERATION      CBC W/O DIFF      2. Anxiety and depression  F41.9 300.00 LIPID PANEL    F32. A 286 METABOLIC PANEL, COMPREHENSIVE      HEMOGLOBIN A1C WITH EAG   Overall stable on Zoloft  recently passed away   TSH 3RD GENERATION      CBC W/O DIFF      Depression screen reviewed and negative. Scribed by Johnnie Shelley, as dictated by Dr. Shamar Luther. Current diagnosis and concerns discussed with pt at length. Pt understands risks and benefits or current treatment plan and medications, and accepts the treatment and medication with any possible risks. Pt asks appropriate questions, which were answered. Pt was instructed to call with any concerns or problems.     I have reviewed the note documented by the scribe. The services provided are my own. The documentation is accurate.

## 2022-12-19 ENCOUNTER — OFFICE VISIT (OUTPATIENT)
Dept: INTERNAL MEDICINE CLINIC | Age: 62
End: 2022-12-19
Payer: COMMERCIAL

## 2022-12-19 VITALS
WEIGHT: 146.8 LBS | SYSTOLIC BLOOD PRESSURE: 135 MMHG | HEIGHT: 69 IN | DIASTOLIC BLOOD PRESSURE: 75 MMHG | TEMPERATURE: 97.9 F | BODY MASS INDEX: 21.74 KG/M2 | HEART RATE: 84 BPM | RESPIRATION RATE: 16 BRPM | OXYGEN SATURATION: 98 %

## 2022-12-19 DIAGNOSIS — Z23 NEEDS FLU SHOT: ICD-10-CM

## 2022-12-19 DIAGNOSIS — F41.9 ANXIETY AND DEPRESSION: ICD-10-CM

## 2022-12-19 DIAGNOSIS — F32.A ANXIETY AND DEPRESSION: ICD-10-CM

## 2022-12-19 DIAGNOSIS — Z00.00 PHYSICAL EXAM, ANNUAL: Primary | ICD-10-CM

## 2022-12-19 LAB
ALBUMIN SERPL-MCNC: 4.4 G/DL (ref 3.5–5)
ALBUMIN/GLOB SERPL: 1.5 {RATIO} (ref 1.1–2.2)
ALP SERPL-CCNC: 64 U/L (ref 45–117)
ALT SERPL-CCNC: 15 U/L (ref 12–78)
ANION GAP SERPL CALC-SCNC: 4 MMOL/L (ref 5–15)
AST SERPL-CCNC: 11 U/L (ref 15–37)
BILIRUB SERPL-MCNC: 0.4 MG/DL (ref 0.2–1)
BUN SERPL-MCNC: 16 MG/DL (ref 6–20)
BUN/CREAT SERPL: 27 (ref 12–20)
CALCIUM SERPL-MCNC: 8.9 MG/DL (ref 8.5–10.1)
CHLORIDE SERPL-SCNC: 107 MMOL/L (ref 97–108)
CHOLEST SERPL-MCNC: 166 MG/DL
CO2 SERPL-SCNC: 29 MMOL/L (ref 21–32)
CREAT SERPL-MCNC: 0.59 MG/DL (ref 0.55–1.02)
ERYTHROCYTE [DISTWIDTH] IN BLOOD BY AUTOMATED COUNT: 12.1 % (ref 11.5–14.5)
EST. AVERAGE GLUCOSE BLD GHB EST-MCNC: 108 MG/DL
GLOBULIN SER CALC-MCNC: 3 G/DL (ref 2–4)
GLUCOSE SERPL-MCNC: 101 MG/DL (ref 65–100)
HBA1C MFR BLD: 5.4 % (ref 4–5.6)
HCT VFR BLD AUTO: 39.4 % (ref 35–47)
HDLC SERPL-MCNC: 48 MG/DL
HDLC SERPL: 3.5 {RATIO} (ref 0–5)
HGB BLD-MCNC: 13 G/DL (ref 11.5–16)
LDLC SERPL CALC-MCNC: 100.2 MG/DL (ref 0–100)
MCH RBC QN AUTO: 29.1 PG (ref 26–34)
MCHC RBC AUTO-ENTMCNC: 33 G/DL (ref 30–36.5)
MCV RBC AUTO: 88.1 FL (ref 80–99)
NRBC # BLD: 0 K/UL (ref 0–0.01)
NRBC BLD-RTO: 0 PER 100 WBC
PLATELET # BLD AUTO: 169 K/UL (ref 150–400)
PMV BLD AUTO: 10.9 FL (ref 8.9–12.9)
POTASSIUM SERPL-SCNC: 4.4 MMOL/L (ref 3.5–5.1)
PROT SERPL-MCNC: 7.4 G/DL (ref 6.4–8.2)
RBC # BLD AUTO: 4.47 M/UL (ref 3.8–5.2)
SODIUM SERPL-SCNC: 140 MMOL/L (ref 136–145)
TRIGL SERPL-MCNC: 89 MG/DL (ref ?–150)
TSH SERPL DL<=0.05 MIU/L-ACNC: 0.79 UIU/ML (ref 0.36–3.74)
VLDLC SERPL CALC-MCNC: 17.8 MG/DL
WBC # BLD AUTO: 6.1 K/UL (ref 3.6–11)

## 2022-12-19 PROCEDURE — 90686 IIV4 VACC NO PRSV 0.5 ML IM: CPT | Performed by: INTERNAL MEDICINE

## 2022-12-19 PROCEDURE — 99396 PREV VISIT EST AGE 40-64: CPT | Performed by: INTERNAL MEDICINE

## 2022-12-19 PROCEDURE — 90471 IMMUNIZATION ADMIN: CPT | Performed by: INTERNAL MEDICINE

## 2023-01-26 ENCOUNTER — HOSPITAL ENCOUNTER (OUTPATIENT)
Dept: MAMMOGRAPHY | Age: 63
Discharge: HOME | End: 2023-01-26
Attending: INTERNAL MEDICINE
Payer: COMMERCIAL

## 2023-01-26 DIAGNOSIS — Z00.00 PHYSICAL EXAM, ANNUAL: ICD-10-CM

## 2023-01-26 PROCEDURE — 77063 BREAST TOMOSYNTHESIS BI: CPT

## 2023-02-06 NOTE — TELEPHONE ENCOUNTER
Bone density results:    the patient has osteoporosis on dxa scan. Recommend treatment at this time with fosamax and vitamin D.  Take fosamax 70mg once per week, first thing in the morning, no laying down or eating for 30minutes after taking the medication      Also she called in dec--stated she had colo complete in 9/22--please get colo report

## 2023-02-08 RX ORDER — ALENDRONATE SODIUM 70 MG/1
70 TABLET ORAL
Qty: 12 TABLET | Refills: 2 | Status: SHIPPED | OUTPATIENT
Start: 2023-02-08

## 2023-02-08 NOTE — TELEPHONE ENCOUNTER
Future Appointments:  Future Appointments   Date Time Provider Inga Roth   12/20/2023  9:30 AM Arabella Mijares MD [facility] BS AMB        Last Appointment With Me:  12/19/2022     Requested Prescriptions     Pending Prescriptions Disp Refills    alendronate (FOSAMAX) 70 mg tablet       Sig: Take 1 Tablet by mouth every seven (7) days.

## 2023-02-08 NOTE — TELEPHONE ENCOUNTER
Called, spoke to pt  Received two pt identifiers   Pt informed per Dr. Tori Brian  Bone density results shows osteoporosis on dxa scan. Recommend treatment at this time with fosamax and vitamin D. Take fosamax 70mg once per week, first thing in the morning, no laying down or eating for 30 minutes after taking the medication. ---pending   Pt states already takes Vit D3 2000units daily  Pt states colo was done with Dr. Mamie Joel. Sending letter to get results   Pt verbalizes understanding of the instructions and has no further questions at this time.

## 2023-03-17 RX ORDER — ALENDRONATE SODIUM 70 MG/1
70 TABLET ORAL
Qty: 4 TABLET | Refills: 8 | Status: SHIPPED | OUTPATIENT
Start: 2023-03-17

## 2023-05-16 RX ORDER — HYDROXYZINE HYDROCHLORIDE 25 MG/1
1 TABLET, FILM COATED ORAL NIGHTLY PRN
COMMUNITY
Start: 2021-12-14

## 2023-05-16 RX ORDER — LORAZEPAM 0.5 MG/1
0.5 TABLET ORAL
COMMUNITY
Start: 2020-10-26

## 2023-05-16 RX ORDER — ALBUTEROL SULFATE 90 UG/1
1 AEROSOL, METERED RESPIRATORY (INHALATION) EVERY 6 HOURS PRN
COMMUNITY
Start: 2020-02-10

## 2023-05-16 RX ORDER — SERTRALINE HYDROCHLORIDE 100 MG/1
1 TABLET, FILM COATED ORAL DAILY
COMMUNITY
Start: 2022-09-09

## 2023-05-16 RX ORDER — ALENDRONATE SODIUM 70 MG/1
70 TABLET ORAL
COMMUNITY
Start: 2023-03-17

## 2023-09-13 RX ORDER — SERTRALINE HYDROCHLORIDE 100 MG/1
100 TABLET, FILM COATED ORAL DAILY
Qty: 90 TABLET | Refills: 0 | Status: SHIPPED | OUTPATIENT
Start: 2023-09-13

## 2023-09-13 NOTE — TELEPHONE ENCOUNTER
Patient states she needs refills done thru [facility] due to insurance change, [facility] now on file for her Sertraline (ZOLOFT) 100 MG tablet. Please call if any questions.  Thank you    Patient reminded of [address] around on refills

## 2023-09-13 NOTE — TELEPHONE ENCOUNTER
PCP: Guerrero Valladares MD    Last appt:   12/19/2022    Future Appointments   Date Time Provider [address]   12/20/2023  9:30 AM Severiano Siskin, MD [facility] BS AMB       Requested Prescriptions     Pending Prescriptions Disp Refills    sertraline (ZOLOFT) 100 MG tablet 30 tablet 0     Sig: Take 1 tablet by mouth daily

## 2023-12-11 RX ORDER — SERTRALINE HYDROCHLORIDE 100 MG/1
100 TABLET, FILM COATED ORAL DAILY
Qty: 30 TABLET | Refills: 2 | Status: SHIPPED | OUTPATIENT
Start: 2023-12-11

## 2023-12-20 PROBLEM — M81.0 AGE-RELATED OSTEOPOROSIS WITHOUT CURRENT PATHOLOGICAL FRACTURE: Status: ACTIVE | Noted: 2023-12-20

## 2024-01-08 ENCOUNTER — TRANSCRIBE ORDERS (OUTPATIENT)
Facility: HOSPITAL | Age: 64
End: 2024-01-08

## 2024-01-08 DIAGNOSIS — Z12.31 SCREENING MAMMOGRAM FOR HIGH-RISK PATIENT: Primary | ICD-10-CM

## 2024-01-15 RX ORDER — ALENDRONATE SODIUM 70 MG/1
TABLET ORAL
Qty: 4 TABLET | Refills: 8 | Status: SHIPPED | OUTPATIENT
Start: 2024-01-15

## 2024-01-29 ENCOUNTER — HOSPITAL ENCOUNTER (OUTPATIENT)
Facility: HOSPITAL | Age: 64
Discharge: HOME | End: 2024-02-01
Attending: INTERNAL MEDICINE
Payer: MEDICARE

## 2024-01-29 VITALS — BODY MASS INDEX: 21.18 KG/M2 | WEIGHT: 143 LBS | HEIGHT: 69 IN

## 2024-01-29 DIAGNOSIS — Z12.31 SCREENING MAMMOGRAM FOR HIGH-RISK PATIENT: ICD-10-CM

## 2024-01-29 PROCEDURE — 77063 BREAST TOMOSYNTHESIS BI: CPT

## 2024-05-15 RX ORDER — SERTRALINE HYDROCHLORIDE 100 MG/1
100 TABLET, FILM COATED ORAL DAILY
Qty: 90 TABLET | Refills: 1 | Status: SHIPPED | OUTPATIENT
Start: 2024-05-15

## 2024-05-28 ASSESSMENT — ENCOUNTER SYMPTOMS: SHORTNESS OF BREATH: 0

## 2024-05-28 NOTE — PROGRESS NOTES
HISTORY OF PRESENT ILLNESS  Catia Duffy is a 63 y.o. female.  HPI    This is an established visit completed with telemedicine was completed with video assist. The patient acknowledges and agrees to this method of visitation.    Last here 12/20/23. Pt is here to f/u on chronic conditions.      She reports R heel  pain x 2 and half weeks also has right knee pain over that time  She states she wore new sandals and walked few miles, which s she normally does not do  She mentions pain is worse early in the morning and relieves by evening as far as the heel pain  Discussed this is mostly d/t over use and plantar fascitis  Discussed soft tissues injuries take minimum 2 weeks to heel, advised to walk with good supportive shoes  Discussed to consider PT if this does not improve in 2-3 weeks        She reports burning knee pain x 2 and half weeks for the same time as her heel  She states pain aggravates with walking  Will give meloxicam once daily  Discussed soft tissues injuries take minimum 2 weeks to heel, advised to walk with good supportive shoes  Discussed to consider PT if this does not improve in 2-3 weeks            B  Patient Active Problem List   Diagnosis    Anxiety    Age-related osteoporosis without current pathological fracture     Current Outpatient Medications   Medication Sig Dispense Refill    sertraline (ZOLOFT) 100 MG tablet TAKE 1 TABLET BY MOUTH EVERY DAY 90 tablet 1    alendronate (FOSAMAX) 70 MG tablet TAKE 1 TABLET BY MOUTH EVERY SEVEN (7) DAYS. 4 tablet 8    Multiple Vitamin (MULTIVITAMIN PO) Take by mouth      albuterol sulfate HFA (PROVENTIL;VENTOLIN;PROAIR) 108 (90 Base) MCG/ACT inhaler Inhale 1 puff into the lungs every 6 hours as needed      vitamin D 25 MCG (1000 UT) CAPS Take by mouth daily      hydrOXYzine HCl (ATARAX) 25 MG tablet Take 1 tablet by mouth nightly as needed      LORazepam (ATIVAN) 0.5 MG tablet Take 1 tablet by mouth.      nystatin-triamcinolone (MYCOLOG II) 709630-2.1

## 2024-05-30 ENCOUNTER — TELEMEDICINE (OUTPATIENT)
Age: 64
End: 2024-05-30
Payer: MEDICARE

## 2024-05-30 DIAGNOSIS — M25.561 ACUTE PAIN OF RIGHT KNEE: ICD-10-CM

## 2024-05-30 DIAGNOSIS — M72.2 PLANTAR FASCIITIS OF RIGHT FOOT: Primary | ICD-10-CM

## 2024-05-30 PROCEDURE — G8427 DOCREV CUR MEDS BY ELIG CLIN: HCPCS | Performed by: INTERNAL MEDICINE

## 2024-05-30 PROCEDURE — 1090F PRES/ABSN URINE INCON ASSESS: CPT | Performed by: INTERNAL MEDICINE

## 2024-05-30 PROCEDURE — 1123F ACP DISCUSS/DSCN MKR DOCD: CPT | Performed by: INTERNAL MEDICINE

## 2024-05-30 PROCEDURE — 99213 OFFICE O/P EST LOW 20 MIN: CPT | Performed by: INTERNAL MEDICINE

## 2024-05-30 PROCEDURE — 3017F COLORECTAL CA SCREEN DOC REV: CPT | Performed by: INTERNAL MEDICINE

## 2024-05-30 PROCEDURE — G8400 PT W/DXA NO RESULTS DOC: HCPCS | Performed by: INTERNAL MEDICINE

## 2024-05-30 RX ORDER — MELOXICAM 15 MG/1
15 TABLET ORAL DAILY
Qty: 30 TABLET | Refills: 0 | Status: SHIPPED | OUTPATIENT
Start: 2024-05-30

## 2024-05-30 ASSESSMENT — PATIENT HEALTH QUESTIONNAIRE - PHQ9
SUM OF ALL RESPONSES TO PHQ QUESTIONS 1-9: 0
5. POOR APPETITE OR OVEREATING: NOT AT ALL
10. IF YOU CHECKED OFF ANY PROBLEMS, HOW DIFFICULT HAVE THESE PROBLEMS MADE IT FOR YOU TO DO YOUR WORK, TAKE CARE OF THINGS AT HOME, OR GET ALONG WITH OTHER PEOPLE: NOT DIFFICULT AT ALL
2. FEELING DOWN, DEPRESSED OR HOPELESS: NOT AT ALL
SUM OF ALL RESPONSES TO PHQ QUESTIONS 1-9: 0
8. MOVING OR SPEAKING SO SLOWLY THAT OTHER PEOPLE COULD HAVE NOTICED. OR THE OPPOSITE, BEING SO FIGETY OR RESTLESS THAT YOU HAVE BEEN MOVING AROUND A LOT MORE THAN USUAL: NOT AT ALL
SUM OF ALL RESPONSES TO PHQ QUESTIONS 1-9: 0
4. FEELING TIRED OR HAVING LITTLE ENERGY: NOT AT ALL
3. TROUBLE FALLING OR STAYING ASLEEP: NOT AT ALL
7. TROUBLE CONCENTRATING ON THINGS, SUCH AS READING THE NEWSPAPER OR WATCHING TELEVISION: NOT AT ALL
SUM OF ALL RESPONSES TO PHQ QUESTIONS 1-9: 0
9. THOUGHTS THAT YOU WOULD BE BETTER OFF DEAD, OR OF HURTING YOURSELF: NOT AT ALL
1. LITTLE INTEREST OR PLEASURE IN DOING THINGS: NOT AT ALL
SUM OF ALL RESPONSES TO PHQ9 QUESTIONS 1 & 2: 0
6. FEELING BAD ABOUT YOURSELF - OR THAT YOU ARE A FAILURE OR HAVE LET YOURSELF OR YOUR FAMILY DOWN: NOT AT ALL

## 2024-05-30 NOTE — PROGRESS NOTES
\"Have you been to the ER, urgent care clinic since your last visit?  Hospitalized since your last visit?\"    NO    “Have you seen or consulted any other health care providers outside of [facility] since your last visit?”    NO     “Have you had a pap smear?”    NO    No cervical cancer screening on file             Click Here for Release of Records Request

## 2024-07-25 ENCOUNTER — TELEPHONE (OUTPATIENT)
Age: 64
End: 2024-07-25

## 2024-07-25 NOTE — TELEPHONE ENCOUNTER
Needs pcp to possible take over this med , was prescribed post surgery.    States advised to consult pcp to see if still advised to take it.    Tamsulosin  0.4 mg    States doesn't feel like she has urinary issues any longer       Call pt to discuss

## 2024-07-26 NOTE — TELEPHONE ENCOUNTER
Called, Spoke with Pt  Received two pt identifiers  Pt informed per Dr. Bland okjesi to stop flomax  Pt verbalizes understanding of the instructions and has no further questions at this time.

## 2024-07-26 NOTE — TELEPHONE ENCOUNTER
Called, Spoke with Pt  Received two pt identifiers  Pt fell off ladder and  her tibia and fibula in June  Pt was put on flomax due to having trouble urinating while in the hospital  Pt was sent home and told to keep taking flomax  Pt states has not had trouble urinating at all the past couple weeks  Pt states no blood in urine, not straining to urinate, and no pain when urinating  Pt states was told to consult PCP before she stops the med  Advised pt will send message to Dr. Bland to advise if okay to stop flomax  Pt verbalizes understanding of the instructions and has no further questions at this time.

## 2024-12-22 NOTE — PATIENT INSTRUCTIONS
to screen for glaucoma; cataracts, macular degeneration, and other eye disorders.  A preventive dental visit is recommended every 6 months.  Try to get at least 150 minutes of exercise per week or 10,000 steps per day on a pedometer .  Order or download the FREE \"Exercise & Physical Activity: Your Everyday Guide\" from [employer] [place] [employer]. Call 1-362.233.2956 or search [employer] [place] [employer] online.  You need 8252-2827 mg of calcium and 2152-0644 IU of vitamin D per day. It is possible to meet your calcium requirement with diet alone, but a vitamin D supplement is usually necessary to meet this goal.  When exposed to the sun, use a sunscreen that protects against both UVA and UVB radiation with an SPF of 30 or greater. Reapply every 2 to 3 hours or after sweating, drying off with a towel, or swimming.  Always wear a seat belt when traveling in a car. Always wear a helmet when riding a bicycle or motorcycle.                  Zyrtec and flonase over the counter

## 2024-12-23 ENCOUNTER — OFFICE VISIT (OUTPATIENT)
Age: 64
End: 2024-12-23
Payer: MEDICARE

## 2024-12-23 VITALS
RESPIRATION RATE: 15 BRPM | HEIGHT: 69 IN | OXYGEN SATURATION: 99 % | BODY MASS INDEX: 22.13 KG/M2 | SYSTOLIC BLOOD PRESSURE: 115 MMHG | DIASTOLIC BLOOD PRESSURE: 70 MMHG | TEMPERATURE: 97.1 F | WEIGHT: 149.4 LBS | HEART RATE: 77 BPM

## 2024-12-23 DIAGNOSIS — R05.3 CHRONIC COUGH: ICD-10-CM

## 2024-12-23 DIAGNOSIS — F41.9 ANXIETY: ICD-10-CM

## 2024-12-23 DIAGNOSIS — E55.9 VITAMIN D DEFICIENCY: ICD-10-CM

## 2024-12-23 DIAGNOSIS — D50.9 IRON DEFICIENCY ANEMIA, UNSPECIFIED IRON DEFICIENCY ANEMIA TYPE: ICD-10-CM

## 2024-12-23 DIAGNOSIS — Z23 NEEDS FLU SHOT: ICD-10-CM

## 2024-12-23 DIAGNOSIS — Z00.00 MEDICARE ANNUAL WELLNESS VISIT, SUBSEQUENT: ICD-10-CM

## 2024-12-23 DIAGNOSIS — M81.0 AGE-RELATED OSTEOPOROSIS WITHOUT CURRENT PATHOLOGICAL FRACTURE: Primary | ICD-10-CM

## 2024-12-23 LAB
25(OH)D3 SERPL-MCNC: 35.3 NG/ML (ref 30–100)
ALBUMIN SERPL-MCNC: 4.4 G/DL (ref 3.5–5)
ALBUMIN/GLOB SERPL: 1.5 (ref 1.1–2.2)
ALP SERPL-CCNC: 64 U/L (ref 45–117)
ALT SERPL-CCNC: 14 U/L (ref 12–78)
ANION GAP SERPL CALC-SCNC: 2 MMOL/L (ref 2–12)
AST SERPL-CCNC: 9 U/L (ref 15–37)
BILIRUB SERPL-MCNC: 0.5 MG/DL (ref 0.2–1)
BUN SERPL-MCNC: 20 MG/DL (ref 6–20)
BUN/CREAT SERPL: 34 (ref 12–20)
CALCIUM SERPL-MCNC: 8 MG/DL (ref 8.5–10.1)
CHLORIDE SERPL-SCNC: 110 MMOL/L (ref 97–108)
CHOLEST SERPL-MCNC: 172 MG/DL
CO2 SERPL-SCNC: 29 MMOL/L (ref 21–32)
CREAT SERPL-MCNC: 0.59 MG/DL (ref 0.55–1.02)
ERYTHROCYTE [DISTWIDTH] IN BLOOD BY AUTOMATED COUNT: 12.8 % (ref 11.5–14.5)
EST. AVERAGE GLUCOSE BLD GHB EST-MCNC: 105 MG/DL
GLOBULIN SER CALC-MCNC: 3 G/DL (ref 2–4)
GLUCOSE SERPL-MCNC: 96 MG/DL (ref 65–100)
HBA1C MFR BLD: 5.3 % (ref 4–5.6)
HCT VFR BLD AUTO: 41.5 % (ref 35–47)
HDLC SERPL-MCNC: 46 MG/DL
HDLC SERPL: 3.7 (ref 0–5)
HGB BLD-MCNC: 13.1 G/DL (ref 11.5–16)
LDLC SERPL CALC-MCNC: 110.2 MG/DL (ref 0–100)
MCH RBC QN AUTO: 28.4 PG (ref 26–34)
MCHC RBC AUTO-ENTMCNC: 31.6 G/DL (ref 30–36.5)
MCV RBC AUTO: 89.8 FL (ref 80–99)
NRBC # BLD: 0 K/UL (ref 0–0.01)
NRBC BLD-RTO: 0 PER 100 WBC
PLATELET # BLD AUTO: 178 K/UL (ref 150–400)
PMV BLD AUTO: 11.3 FL (ref 8.9–12.9)
POTASSIUM SERPL-SCNC: 4.2 MMOL/L (ref 3.5–5.1)
PROT SERPL-MCNC: 7.4 G/DL (ref 6.4–8.2)
RBC # BLD AUTO: 4.62 M/UL (ref 3.8–5.2)
SODIUM SERPL-SCNC: 141 MMOL/L (ref 136–145)
TRIGL SERPL-MCNC: 79 MG/DL
TSH SERPL DL<=0.05 MIU/L-ACNC: 1.26 UIU/ML (ref 0.36–3.74)
VLDLC SERPL CALC-MCNC: 15.8 MG/DL
WBC # BLD AUTO: 4.9 K/UL (ref 3.6–11)

## 2024-12-23 PROCEDURE — 90653 IIV ADJUVANT VACCINE IM: CPT | Performed by: INTERNAL MEDICINE

## 2024-12-23 PROCEDURE — 99214 OFFICE O/P EST MOD 30 MIN: CPT | Performed by: INTERNAL MEDICINE

## 2024-12-23 RX ORDER — MULTIVIT WITH MINERALS/LUTEIN
250 TABLET ORAL DAILY
COMMUNITY

## 2024-12-23 SDOH — ECONOMIC STABILITY: INCOME INSECURITY: HOW HARD IS IT FOR YOU TO PAY FOR THE VERY BASICS LIKE FOOD, HOUSING, MEDICAL CARE, AND HEATING?: NOT VERY HARD

## 2024-12-23 SDOH — HEALTH STABILITY: PHYSICAL HEALTH: ON AVERAGE, HOW MANY DAYS PER WEEK DO YOU ENGAGE IN MODERATE TO STRENUOUS EXERCISE (LIKE A BRISK WALK)?: 7 DAYS

## 2024-12-23 SDOH — ECONOMIC STABILITY: FOOD INSECURITY: WITHIN THE PAST 12 MONTHS, THE FOOD YOU BOUGHT JUST DIDN'T LAST AND YOU DIDN'T HAVE MONEY TO GET MORE.: NEVER TRUE

## 2024-12-23 SDOH — ECONOMIC STABILITY: FOOD INSECURITY: WITHIN THE PAST 12 MONTHS, YOU WORRIED THAT YOUR FOOD WOULD RUN OUT BEFORE YOU GOT MONEY TO BUY MORE.: NEVER TRUE

## 2024-12-23 SDOH — ECONOMIC STABILITY: TRANSPORTATION INSECURITY
IN THE PAST 12 MONTHS, HAS LACK OF TRANSPORTATION KEPT YOU FROM MEETINGS, WORK, OR FROM GETTING THINGS NEEDED FOR DAILY LIVING?: NO

## 2024-12-23 SDOH — HEALTH STABILITY: PHYSICAL HEALTH: ON AVERAGE, HOW MANY MINUTES DO YOU ENGAGE IN EXERCISE AT THIS LEVEL?: 60 MIN

## 2024-12-23 ASSESSMENT — PATIENT HEALTH QUESTIONNAIRE - PHQ9
SUM OF ALL RESPONSES TO PHQ QUESTIONS 1-9: 0
SUM OF ALL RESPONSES TO PHQ9 QUESTIONS 1 & 2: 0
SUM OF ALL RESPONSES TO PHQ QUESTIONS 1-9: 0
1. LITTLE INTEREST OR PLEASURE IN DOING THINGS: NOT AT ALL
2. FEELING DOWN, DEPRESSED OR HOPELESS: NOT AT ALL

## 2024-12-23 ASSESSMENT — LIFESTYLE VARIABLES
HAVE YOU OR SOMEONE ELSE BEEN INJURED AS A RESULT OF YOUR DRINKING: NO
HOW OFTEN DURING THE LAST YEAR HAVE YOU FOUND THAT YOU WERE NOT ABLE TO STOP DRINKING ONCE YOU HAD STARTED: NEVER
HOW OFTEN DURING THE LAST YEAR HAVE YOU NEEDED AN ALCOHOLIC DRINK FIRST THING IN THE MORNING TO GET YOURSELF GOING AFTER A NIGHT OF HEAVY DRINKING: NEVER
HOW OFTEN DURING THE LAST YEAR HAVE YOU BEEN UNABLE TO REMEMBER WHAT HAPPENED THE NIGHT BEFORE BECAUSE YOU HAD BEEN DRINKING: NEVER
HOW OFTEN DURING THE LAST YEAR HAVE YOU BEEN UNABLE TO REMEMBER WHAT HAPPENED THE NIGHT BEFORE BECAUSE YOU HAD BEEN DRINKING: NEVER
HOW OFTEN DURING THE LAST YEAR HAVE YOU HAD A FEELING OF GUILT OR REMORSE AFTER DRINKING: NEVER
HAVE YOU OR SOMEONE ELSE BEEN INJURED AS A RESULT OF YOUR DRINKING: NO
HOW OFTEN DO YOU HAVE A DRINK CONTAINING ALCOHOL: 5
HOW MANY STANDARD DRINKS CONTAINING ALCOHOL DO YOU HAVE ON A TYPICAL DAY: 1 OR 2
HOW OFTEN DURING THE LAST YEAR HAVE YOU FAILED TO DO WHAT WAS NORMALLY EXPECTED FROM YOU BECAUSE OF DRINKING: NEVER
HOW OFTEN DO YOU HAVE A DRINK CONTAINING ALCOHOL: 4 OR MORE TIMES A WEEK
HOW OFTEN DURING THE LAST YEAR HAVE YOU FOUND THAT YOU WERE NOT ABLE TO STOP DRINKING ONCE YOU HAD STARTED: NEVER
HOW OFTEN DO YOU HAVE SIX OR MORE DRINKS ON ONE OCCASION: 1
HOW MANY STANDARD DRINKS CONTAINING ALCOHOL DO YOU HAVE ON A TYPICAL DAY: 1
HOW OFTEN DURING THE LAST YEAR HAVE YOU HAD A FEELING OF GUILT OR REMORSE AFTER DRINKING: NEVER
HOW OFTEN DURING THE LAST YEAR HAVE YOU NEEDED AN ALCOHOLIC DRINK FIRST THING IN THE MORNING TO GET YOURSELF GOING AFTER A NIGHT OF HEAVY DRINKING: NEVER
HOW OFTEN DURING THE LAST YEAR HAVE YOU FAILED TO DO WHAT WAS NORMALLY EXPECTED FROM YOU BECAUSE OF DRINKING: NEVER
HAS A RELATIVE, FRIEND, DOCTOR, OR ANOTHER HEALTH PROFESSIONAL EXPRESSED CONCERN ABOUT YOUR DRINKING OR SUGGESTED YOU CUT DOWN: NO
HAS A RELATIVE, FRIEND, DOCTOR, OR ANOTHER HEALTH PROFESSIONAL EXPRESSED CONCERN ABOUT YOUR DRINKING OR SUGGESTED YOU CUT DOWN: NO

## 2024-12-23 NOTE — PROGRESS NOTES
Medicare Annual Wellness Visit    Catia Duffy is here for Medicare AWV and Cough (Off and on cough x 2 years)    Assessment & Plan   Age-related osteoporosis without current pathological fracture  -     CBC; Future  -     Comprehensive Metabolic Panel; Future  -     Lipid Panel; Future  -     TSH; Future  -     Vitamin D 25 Hydroxy; Future  -     Hemoglobin A1C; Future  Anxiety  -     CBC; Future  -     Comprehensive Metabolic Panel; Future  -     Lipid Panel; Future  -     TSH; Future  -     Vitamin D 25 Hydroxy; Future  -     Hemoglobin A1C; Future  Medicare annual wellness visit, subsequent  Needs flu shot  -     Influenza, FLUAD Trivalent, (age 65 y+), IM, Preservative Free, 0.5mL  Iron deficiency anemia, unspecified iron deficiency anemia type  -     CBC; Future  -     Comprehensive Metabolic Panel; Future  -     Lipid Panel; Future  -     TSH; Future  -     Vitamin D 25 Hydroxy; Future  -     Hemoglobin A1C; Future  Vitamin D deficiency  -     CBC; Future  -     Comprehensive Metabolic Panel; Future  -     Lipid Panel; Future  -     TSH; Future  -     Vitamin D 25 Hydroxy; Future  -     Hemoglobin A1C; Future  Chronic cough  -     External Referral To ENT     Recommendations for Preventive Services Due: see orders and patient instructions/AVS.  Recommended screening schedule for the next 5-10 years is provided to the patient in written form: see Patient Instructions/AVS.     Return in about 1 year (around 12/23/2025).     Subjective       Patient's complete Health Risk Assessment and screening values have been reviewed and are found in Flowsheets. The following problems were reviewed today and where indicated follow up appointments were made and/or referrals ordered.    Positive Risk Factor Screenings with Interventions:    Fall Risk:  Do you feel unsteady or are you worried about falling? : no  2 or more falls in past year?: (!) yes  Fall with injury in past year?: (!) yes     Interventions:  
\"Have you been to the ER, urgent care clinic since your last visit?  Hospitalized since your last visit?\"    YES - When: approximately 6 months ago.  Where and Why: Broken leg.    “Have you seen or consulted any other health care providers outside our system since your last visit?”    NO           
cervical adenopathy.   Skin:     General: Skin is warm and dry.      Findings: No erythema.   Neurological:      General: No focal deficit present.      Mental Status: She is oriented to person, place, and time. Mental status is at baseline.      Gait: Gait normal.   Psychiatric:         Mood and Affect: Mood normal.           ASSESSMENT and PLAN   Diagnosis Orders   1. Age-related osteoporosis without current pathological fracture  CBC    Comprehensive Metabolic Panel    Lipid Panel   Continue Fosamax weekly bone density improved will continue for 2 more years bone density test up-to-date TSH    Vitamin D 25 Hydroxy    Hemoglobin A1C      2. Anxiety  CBC    Comprehensive Metabolic Panel    Lipid Panel    TSH   Well-controlled with Zoloft milligrams daily continue Vitamin D 25 Hydroxy    Hemoglobin A1C      3. Medicare annual wellness visit, subsequent        4. Needs flu shot  Influenza, FLUAD Trivalent, (age 65 y+), IM, Preservative Free, 0.5mL      5. Iron deficiency anemia, unspecified iron deficiency anemia type  CBC    Comprehensive Metabolic Panel    Lipid Panel   Mild anemia around the time of her knee fracture repeat CBC should be back to normal TSH    Vitamin D 25 Hydroxy    Hemoglobin A1C      6. Vitamin D deficiency  CBC    Comprehensive Metabolic Panel    Lipid Panel   Continue OTC vitamin D check level TSH    Vitamin D 25 Hydroxy    Hemoglobin A1C      7. Chronic cough  External Referral To ENT   Patient reports a chronic cough she mentions that she thinks it was since she last had a chest x-ray which is in 2020 she is really not great on the timeframe    Will start by treating postnasal drip with Flonase and Zyrtec will have her see ENT if no improvement will get repeat chest imaging discussed all the above with patient        I have reviewed the note documented by the scribe.  The services provided are my own.  The documentation is accurate     Depression screen reviewed and negative.  Scribed by

## 2024-12-24 DIAGNOSIS — E83.51 LOW CALCIUM LEVELS: Primary | ICD-10-CM

## 2024-12-24 LAB
CALCIUM SERPL-MCNC: 9 MG/DL (ref 8.5–10.1)
MAGNESIUM SERPL-MCNC: 2.3 MG/DL (ref 1.6–2.4)
PTH-INTACT SERPL-MCNC: 49.9 PG/ML (ref 18.4–88)

## 2024-12-24 NOTE — RESULT ENCOUNTER NOTE
Called, Spoke with Pt  Received two pt identifiers  Pt informed per Dr. Bland Calcium low--start otc calcium 500mg bid  Pt informed per Dr. Bland Repeat calcium/pth level in 2-3 weeks  Pt verbalizes understanding of the instructions and has no further questions at this time.

## 2025-01-28 DIAGNOSIS — E83.51 LOW CALCIUM LEVELS: ICD-10-CM

## 2025-01-29 LAB
CALCIUM SERPL-MCNC: 9.3 MG/DL (ref 8.5–10.1)
PTH-INTACT SERPL-MCNC: 51.1 PG/ML (ref 18.4–88)

## 2025-06-03 RX ORDER — SERTRALINE HYDROCHLORIDE 100 MG/1
100 TABLET, FILM COATED ORAL DAILY
Qty: 90 TABLET | Refills: 1 | Status: SHIPPED | OUTPATIENT
Start: 2025-06-03